# Patient Record
Sex: FEMALE | Race: BLACK OR AFRICAN AMERICAN | NOT HISPANIC OR LATINO | Employment: FULL TIME | URBAN - METROPOLITAN AREA
[De-identification: names, ages, dates, MRNs, and addresses within clinical notes are randomized per-mention and may not be internally consistent; named-entity substitution may affect disease eponyms.]

---

## 2018-05-26 ENCOUNTER — APPOINTMENT (OUTPATIENT)
Dept: RADIOLOGY | Facility: MEDICAL CENTER | Age: 53
End: 2018-05-26
Attending: EMERGENCY MEDICINE
Payer: MEDICAID

## 2018-05-26 ENCOUNTER — HOSPITAL ENCOUNTER (EMERGENCY)
Facility: MEDICAL CENTER | Age: 53
End: 2018-05-26
Attending: EMERGENCY MEDICINE
Payer: MEDICAID

## 2018-05-26 VITALS
BODY MASS INDEX: 24.32 KG/M2 | HEART RATE: 90 BPM | DIASTOLIC BLOOD PRESSURE: 70 MMHG | HEIGHT: 65 IN | WEIGHT: 145.94 LBS | SYSTOLIC BLOOD PRESSURE: 130 MMHG | TEMPERATURE: 99.7 F | RESPIRATION RATE: 20 BRPM | OXYGEN SATURATION: 96 %

## 2018-05-26 DIAGNOSIS — E87.6 HYPOKALEMIA: ICD-10-CM

## 2018-05-26 DIAGNOSIS — F10.930 ALCOHOL WITHDRAWAL SYNDROME WITHOUT COMPLICATION (HCC): ICD-10-CM

## 2018-05-26 DIAGNOSIS — J18.9 PNEUMONIA OF LEFT UPPER LOBE DUE TO INFECTIOUS ORGANISM: ICD-10-CM

## 2018-05-26 LAB
ALBUMIN SERPL BCP-MCNC: 3.3 G/DL (ref 3.2–4.9)
ALBUMIN/GLOB SERPL: 0.9 G/DL
ALP SERPL-CCNC: 82 U/L (ref 30–99)
ALT SERPL-CCNC: 21 U/L (ref 2–50)
ANION GAP SERPL CALC-SCNC: 8 MMOL/L (ref 0–11.9)
APPEARANCE UR: CLEAR
AST SERPL-CCNC: 21 U/L (ref 12–45)
BACTERIA #/AREA URNS HPF: ABNORMAL /HPF
BASOPHILS # BLD AUTO: 0.3 % (ref 0–1.8)
BASOPHILS # BLD: 0.03 K/UL (ref 0–0.12)
BILIRUB SERPL-MCNC: 0.6 MG/DL (ref 0.1–1.5)
BILIRUB UR QL STRIP.AUTO: ABNORMAL
BUN SERPL-MCNC: 8 MG/DL (ref 8–22)
CALCIUM SERPL-MCNC: 9.3 MG/DL (ref 8.4–10.2)
CHLORIDE SERPL-SCNC: 95 MMOL/L (ref 96–112)
CO2 SERPL-SCNC: 28 MMOL/L (ref 20–33)
COLOR UR: YELLOW
CREAT SERPL-MCNC: 0.66 MG/DL (ref 0.5–1.4)
EOSINOPHIL # BLD AUTO: 0.01 K/UL (ref 0–0.51)
EOSINOPHIL NFR BLD: 0.1 % (ref 0–6.9)
EPI CELLS #/AREA URNS HPF: ABNORMAL /HPF
ERYTHROCYTE [DISTWIDTH] IN BLOOD BY AUTOMATED COUNT: 42.3 FL (ref 35.9–50)
FLUAV RNA SPEC QL NAA+PROBE: NEGATIVE
FLUAV+FLUBV AG SPEC QL IA: NORMAL
FLUBV RNA SPEC QL NAA+PROBE: NEGATIVE
GLOBULIN SER CALC-MCNC: 3.5 G/DL (ref 1.9–3.5)
GLUCOSE SERPL-MCNC: 115 MG/DL (ref 65–99)
GLUCOSE UR STRIP.AUTO-MCNC: NEGATIVE MG/DL
HCT VFR BLD AUTO: 35.7 % (ref 37–47)
HGB BLD-MCNC: 12.7 G/DL (ref 12–16)
IMM GRANULOCYTES # BLD AUTO: 0.04 K/UL (ref 0–0.11)
IMM GRANULOCYTES NFR BLD AUTO: 0.4 % (ref 0–0.9)
KETONES UR STRIP.AUTO-MCNC: ABNORMAL MG/DL
LEUKOCYTE ESTERASE UR QL STRIP.AUTO: NEGATIVE
LYMPHOCYTES # BLD AUTO: 1.63 K/UL (ref 1–4.8)
LYMPHOCYTES NFR BLD: 16.4 % (ref 22–41)
MCH RBC QN AUTO: 34 PG (ref 27–33)
MCHC RBC AUTO-ENTMCNC: 35.6 G/DL (ref 33.6–35)
MCV RBC AUTO: 95.5 FL (ref 81.4–97.8)
MICRO URNS: ABNORMAL
MONOCYTES # BLD AUTO: 0.8 K/UL (ref 0–0.85)
MONOCYTES NFR BLD AUTO: 8 % (ref 0–13.4)
MUCOUS THREADS #/AREA URNS HPF: ABNORMAL /HPF
NEUTROPHILS # BLD AUTO: 7.44 K/UL (ref 2–7.15)
NEUTROPHILS NFR BLD: 74.8 % (ref 44–72)
NITRITE UR QL STRIP.AUTO: NEGATIVE
NRBC # BLD AUTO: 0 K/UL
NRBC BLD-RTO: 0 /100 WBC
PH UR STRIP.AUTO: 6.5 [PH]
PLATELET # BLD AUTO: 247 K/UL (ref 164–446)
PMV BLD AUTO: 8.7 FL (ref 9–12.9)
POTASSIUM SERPL-SCNC: 2.7 MMOL/L (ref 3.6–5.5)
PROT SERPL-MCNC: 6.8 G/DL (ref 6–8.2)
PROT UR QL STRIP: 30 MG/DL
RBC # BLD AUTO: 3.74 M/UL (ref 4.2–5.4)
RBC # URNS HPF: ABNORMAL /HPF
RBC UR QL AUTO: NEGATIVE
SIGNIFICANT IND 70042: NORMAL
SITE SITE: NORMAL
SODIUM SERPL-SCNC: 131 MMOL/L (ref 135–145)
SOURCE SOURCE: NORMAL
SP GR UR STRIP.AUTO: 1.02
WBC # BLD AUTO: 10 K/UL (ref 4.8–10.8)
WBC #/AREA URNS HPF: ABNORMAL /HPF

## 2018-05-26 PROCEDURE — 96375 TX/PRO/DX INJ NEW DRUG ADDON: CPT

## 2018-05-26 PROCEDURE — 81001 URINALYSIS AUTO W/SCOPE: CPT

## 2018-05-26 PROCEDURE — 85025 COMPLETE CBC W/AUTO DIFF WBC: CPT

## 2018-05-26 PROCEDURE — 700111 HCHG RX REV CODE 636 W/ 250 OVERRIDE (IP)

## 2018-05-26 PROCEDURE — 700105 HCHG RX REV CODE 258

## 2018-05-26 PROCEDURE — 99285 EMERGENCY DEPT VISIT HI MDM: CPT

## 2018-05-26 PROCEDURE — 36415 COLL VENOUS BLD VENIPUNCTURE: CPT

## 2018-05-26 PROCEDURE — 71045 X-RAY EXAM CHEST 1 VIEW: CPT

## 2018-05-26 PROCEDURE — 87400 INFLUENZA A/B EACH AG IA: CPT

## 2018-05-26 PROCEDURE — 700102 HCHG RX REV CODE 250 W/ 637 OVERRIDE(OP): Performed by: EMERGENCY MEDICINE

## 2018-05-26 PROCEDURE — 96365 THER/PROPH/DIAG IV INF INIT: CPT

## 2018-05-26 PROCEDURE — 80053 COMPREHEN METABOLIC PANEL: CPT

## 2018-05-26 PROCEDURE — 700105 HCHG RX REV CODE 258: Performed by: EMERGENCY MEDICINE

## 2018-05-26 PROCEDURE — 96367 TX/PROPH/DG ADDL SEQ IV INF: CPT

## 2018-05-26 PROCEDURE — 700111 HCHG RX REV CODE 636 W/ 250 OVERRIDE (IP): Performed by: EMERGENCY MEDICINE

## 2018-05-26 PROCEDURE — 87502 INFLUENZA DNA AMP PROBE: CPT

## 2018-05-26 PROCEDURE — A9270 NON-COVERED ITEM OR SERVICE: HCPCS | Performed by: EMERGENCY MEDICINE

## 2018-05-26 RX ORDER — AZITHROMYCIN 250 MG/1
TABLET, FILM COATED ORAL
Qty: 6 TAB | Refills: 0 | Status: SHIPPED | OUTPATIENT
Start: 2018-05-26 | End: 2020-01-12

## 2018-05-26 RX ORDER — ASPIRIN 325 MG
650 TABLET ORAL DAILY
Status: SHIPPED | COMMUNITY
End: 2020-01-12

## 2018-05-26 RX ORDER — AMOXICILLIN AND CLAVULANATE POTASSIUM 875; 125 MG/1; MG/1
1 TABLET, FILM COATED ORAL 2 TIMES DAILY
Qty: 20 TAB | Refills: 0 | Status: SHIPPED | OUTPATIENT
Start: 2018-05-26 | End: 2018-06-05

## 2018-05-26 RX ORDER — SODIUM CHLORIDE 9 MG/ML
1000 INJECTION, SOLUTION INTRAVENOUS ONCE
Status: COMPLETED | OUTPATIENT
Start: 2018-05-26 | End: 2018-05-26

## 2018-05-26 RX ORDER — LORAZEPAM 2 MG/ML
0.5 INJECTION INTRAMUSCULAR ONCE
Status: COMPLETED | OUTPATIENT
Start: 2018-05-26 | End: 2018-05-26

## 2018-05-26 RX ORDER — POTASSIUM CHLORIDE 20 MEQ/1
40 TABLET, EXTENDED RELEASE ORAL ONCE
Status: COMPLETED | OUTPATIENT
Start: 2018-05-26 | End: 2018-05-26

## 2018-05-26 RX ORDER — AMLODIPINE BESYLATE 10 MG/1
10 TABLET ORAL DAILY
COMMUNITY

## 2018-05-26 RX ADMIN — LORAZEPAM 2 MG: 2 INJECTION INTRAMUSCULAR; INTRAVENOUS at 07:29

## 2018-05-26 RX ADMIN — POTASSIUM CHLORIDE 40 MEQ: 1500 TABLET, EXTENDED RELEASE ORAL at 08:45

## 2018-05-26 RX ADMIN — SODIUM CHLORIDE 1000 ML: 9 INJECTION, SOLUTION INTRAVENOUS at 07:28

## 2018-05-26 RX ADMIN — AZITHROMYCIN MONOHYDRATE 500 MG: 500 INJECTION, POWDER, LYOPHILIZED, FOR SOLUTION INTRAVENOUS at 08:38

## 2018-05-26 RX ADMIN — SODIUM CHLORIDE 3 G: 900 INJECTION INTRAVENOUS at 09:55

## 2018-05-26 ASSESSMENT — PAIN SCALES - GENERAL: PAINLEVEL_OUTOF10: 8

## 2018-05-26 NOTE — ED NOTES
Cough for one week, quit smoking three days ago because she couldn't breath if she inhaled. Quit drinking three days ago also, usually had a beer and several shots of vodka a day for many years. Also has flank pain.

## 2018-05-26 NOTE — ED NOTES
Chito from Lab called with critical result of Potassium at 2.7. Critical lab result read back to Chito.   Dr. Kam notified of critical lab result at 0801.  Critical lab result read back by Dr. Kam.

## 2018-05-26 NOTE — DISCHARGE INSTRUCTIONS
Alcohol Withdrawal  Alcohol withdrawal is a group of symptoms that can develop when a person who drinks heavily and regularly stops drinking or drinks less.  What are the causes?  Heavy and regular drinking can cause chemicals that send signals from the brain to the body (neurotransmitters) to deactivate. Alcohol withdrawal develops when deactivated neurotransmitters reactivate because a person stops drinking or drinks less.  What increases the risk?  The more a person drinks and the longer he or she drinks, the greater the risk of alcohol withdrawal. Severe withdrawal is more likely to develop in someone who:  · Had severe alcohol withdrawal in the past.  · Had a seizure during a previous episode of alcohol withdrawal.  · Is elderly.  · Is pregnant.  · Has been abusing drugs.  · Has other medical problems, including:  ¨ Infection.  ¨ Heart, lung, or liver disease.  ¨ Seizures.  ¨ Mental health problems.  What are the signs or symptoms?  Symptoms of this condition can be mild to moderate, or they can be severe.  Mild to moderate symptoms may include:  · Fatigue.  · Nightmares.  · Trouble sleeping.  · Depression.  · Anxiety.  · Inability to think clearly.  · Mood swings.  · Irritability.  · Loss of appetite.  · Nausea or vomiting.  · Clammy skin.  · Extreme sweating.  · Rapid heartbeat.  · Shakiness.  · Uncontrollable shaking (tremor).  Severe symptoms may include:  · Fever.  · Seizures.  · Severe confusion.  · Feeling or seeing things that are not there (hallucinations).  Symptoms usually begin within eight hours after a person stops drinking or drinks less. They can last for weeks.  How is this diagnosed?  Alcohol withdrawal is diagnosed with a medical history and physical exam. Sometimes, urine and blood tests are also done.  How is this treated?  Treatment may involve:  · Monitoring blood pressure, pulse, and breathing.  · Getting fluids through an IV tube.  · Medicine to reduce anxiety.  · Medicine to prevent or  control seizures.  · Multivitamins and B vitamins.  · Having a health care provider check on you daily.  If symptoms are moderate to severe or if there is a risk of severe withdrawal, treatment may be done at a hospital or treatment center.  Follow these instructions at home:  · Take medicines and vitamin supplements only as directed by your health care provider.  · Do not drink alcohol.  · Have someone stay with you or be available if you need help.  · Drink enough fluid to keep your urine clear or pale yellow.  · Consider joining a 12-step program or another alcohol support group.  Contact a health care provider if:  · Your symptoms get worse or do not go away.  · You cannot keep food or water in your stomach.  · You are struggling with not drinking alcohol.  · You cannot stop drinking alcohol.  Get help right away if:  · You have an irregular heartbeat.  · You have chest pain.  · You have trouble breathing.  · You have symptoms of severe withdrawal, such as:  ¨ A fever.  ¨ Seizures.  ¨ Severe confusion.  ¨ Hallucinations.  This information is not intended to replace advice given to you by your health care provider. Make sure you discuss any questions you have with your health care provider.  Document Released: 09/27/2006 Document Revised: 04/26/2017 Document Reviewed: 10/06/2015  [employer] Interactive Patient Education © 2017 [employer]      Community-Acquired Pneumonia, Adult  Pneumonia is an infection of the lungs. There are different types of pneumonia. One type can develop while a person is in a hospital. A different type, called community-acquired pneumonia, develops in people who are not, or have not recently been, in the hospital or other health care facility.  What are the causes?  Pneumonia may be caused by bacteria, viruses, or funguses. Community-acquired pneumonia is often caused by Streptococcus pneumonia bacteria. These bacteria are often passed from one person to another by breathing in droplets  from the cough or sneeze of an infected person.  What increases the risk?  The condition is more likely to develop in:  · People who have chronic diseases, such as chronic obstructive pulmonary disease (COPD), asthma, congestive heart failure, cystic fibrosis, diabetes, or kidney disease.  · People who have early-stage or late-stage HIV.  · People who have sickle cell disease.  · People who have had their spleen removed (splenectomy).  · People who have poor dental hygiene.  · People who have medical conditions that increase the risk of breathing in (aspirating) secretions their own mouth and nose.  · People who have a weakened immune system (immunocompromised).  · People who smoke.  · People who travel to areas where pneumonia-causing germs commonly exist.  · People who are around animal habitats or animals that have pneumonia-causing germs, including birds, bats, rabbits, cats, and farm animals.  What are the signs or symptoms?  Symptoms of this condition include:  · A dry cough.  · A wet (productive) cough.  · Fever.  · Sweating.  · Chest pain, especially when breathing deeply or coughing.  · Rapid breathing or difficulty breathing.  · Shortness of breath.  · Shaking chills.  · Fatigue.  · Muscle aches.  How is this diagnosed?  Your health care provider will take a medical history and perform a physical exam. You may also have other tests, including:  · Imaging studies of your chest, including X-rays.  · Tests to check your blood oxygen level and other blood gases.  · Other tests on blood, mucus (sputum), fluid around your lungs (pleural fluid), and urine.  If your pneumonia is severe, other tests may be done to identify the specific cause of your illness.  How is this treated?  The type of treatment that you receive depends on many factors, such as the cause of your pneumonia, the medicines you take, and other medical conditions that you have. For most adults, treatment and recovery from pneumonia may occur at  home. In some cases, treatment must happen in a hospital. Treatment may include:  · Antibiotic medicines, if the pneumonia was caused by bacteria.  · Antiviral medicines, if the pneumonia was caused by a virus.  · Medicines that are given by mouth or through an IV tube.  · Oxygen.  · Respiratory therapy.  Although rare, treating severe pneumonia may include:  · Mechanical ventilation. This is done if you are not breathing well on your own and you cannot maintain a safe blood oxygen level.  · Thoracentesis. This procedure removes fluid around one lung or both lungs to help you breathe better.  Follow these instructions at home:  · Take over-the-counter and prescription medicines only as told by your health care provider.  ¨ Only take cough medicine if you are losing sleep. Understand that cough medicine can prevent your body’s natural ability to remove mucus from your lungs.  ¨ If you were prescribed an antibiotic medicine, take it as told by your health care provider. Do not stop taking the antibiotic even if you start to feel better.  · Sleep in a semi-upright position at night. Try sleeping in a reclining chair, or place a few pillows under your head.  · Do not use tobacco products, including cigarettes, chewing tobacco, and e-cigarettes. If you need help quitting, ask your health care provider.  · Drink enough water to keep your urine clear or pale yellow. This will help to thin out mucus secretions in your lungs.  How is this prevented?  There are ways that you can decrease your risk of developing community-acquired pneumonia. Consider getting a pneumococcal vaccine if:  · You are older than 65 years of age.  · You are older than 19 years of age and are undergoing cancer treatment, have chronic lung disease, or have other medical conditions that affect your immune system. Ask your health care provider if this applies to you.  There are different types and schedules of pneumococcal vaccines. Ask your health care  provider which vaccination option is best for you.  You may also prevent community-acquired pneumonia if you take these actions:  · Get an influenza vaccine every year. Ask your health care provider which type of influenza vaccine is best for you.  · Go to the dentist on a regular basis.  · Wash your hands often. Use hand  if soap and water are not available.  Contact a health care provider if:  · You have a fever.  · You are losing sleep because you cannot control your cough with cough medicine.  Get help right away if:  · You have worsening shortness of breath.  · You have increased chest pain.  · Your sickness becomes worse, especially if you are an older adult or have a weakened immune system.  · You cough up blood.  This information is not intended to replace advice given to you by your health care provider. Make sure you discuss any questions you have with your health care provider.  Document Released: 12/18/2006 Document Revised: 04/27/2017 Document Reviewed: 04/13/2016  [employer] Interactive Patient Education © 2017 [employer]    Hypokalemia  Hypokalemia means a low potassium level in the blood. Symptoms may include muscle weakness and cramping, fatigue, abdominal pain, vomiting, constipation, or irregularities of the heartbeat. Sometimes hypokalemia is discovered by your caregiver if you are taking certain medicines for high blood pressure or kidney disease.   Potassium is an electrolyte that helps regulate the amount of fluid in the body. It also stimulates muscle contraction and maintains a stable acid-base balance. If potassium levels go too low or too high, your health may be in danger. You are at risk for developing shock, heart, and lung problems. Hypokalemia can occur if you have excessive diarrhea, vomiting, or sweating. Potassium can be lost through your kidneys in the urine. Certain common medicines can also cause potassium loss, especially water pills (diuretics). The same is possible  with cortisone medications or certain types of antibiotics. Low potassium can be dangerous if you are taking certain heart medicines. In diabetes, your potassium may fall after you take insulin, especially if your diabetes had been out of control for a while. In rare cases, potassium may be low because you are not getting enough in your diet.   In adults, a potassium level below 3.5 mEq/L is usually considered low.  Hypokalemia can be treated with potassium supplements taken by mouth and a diet that is high in potassium. Foods with high potassium content are:  · Peas, lentils, lima beans, nuts, and dried fruit.   · Whole grain and bran cereals and breads.   · Fresh fruit and vegetables. Examples include:   · Bananas.   · Cantaloupe.   · Grapefruit.   · Oranges.   · Tomatoes.   · Honeydew melons.   · Potatoes.   · Peaches.   · Orange and tomato juices.   · Meats.   See your caregiver as instructed for a follow-up blood test to be sure your potassium is back to normal.  SEEK MEDICAL CARE IF:   · You have nausea, vomiting, constipation, or abdominal pain.   · You have palpitations or irregular heartbeats, chest pain or shortness of breath.   · You have muscle cramps or weakness or fatigue.   · You have lethargy.   SEEK IMMEDIATE MEDICAL CARE IF:   · You have paralysis.   · You have confusion or other mental status changes.   Document Released: 12/18/2006 Document Revised: 03/11/2013 Document Reviewed: 04/13/2011  [employer]® Patient Information ©2013 [employer].

## 2018-05-26 NOTE — ED PROVIDER NOTES
ED Provider Note    CHIEF COMPLAINT  Chief Complaint   Patient presents with   • Cough       HPI  Tatyana Dallas is a 52 y.o. female who presents for evaluation of a cough. The patient states for the past 3 days she has been having a cough. It's been productive of brownish sputum. She thinks she's had fevers associated with this. She is complaining of generalized body aches to include a headache. He is had no vomiting. Coincidentally she stopped drinking alcohol 3 days ago. She was a daily drinker consuming both beer and vodka. She also stopped smoking 3 days ago. She has a history of hypertension.    REVIEW OF SYSTEMS  See HPI for further details. All other systems are negative.     PAST MEDICAL HISTORY  Past Medical History:   Diagnosis Date   • ETOH abuse 2/27/2013   • HTN (hypertension) 2/27/2013   • Hypertension    • Tobacco dependence 2/27/2013       FAMILY HISTORY  Family History   Problem Relation Age of Onset   • Hypertension Mother    • Diabetes Mother    • Diabetes Father    • Hypertension Father    • Heart Disease Father    • Cancer Maternal Grandmother 50     breast, uterine   • Stroke Neg Hx    • Hyperlipidemia Neg Hx    • Genetic Neg Hx    • Lung Disease Neg Hx        SOCIAL HISTORY  Social History     Social History   • Marital status: Single     Spouse name: N/A   • Number of children: N/A   • Years of education: N/A     Social History Main Topics   • Smoking status: Current Every Day Smoker     Packs/day: 0.25     Years: 30.00     Types: Cigarettes   • Smokeless tobacco: Never Used   • Alcohol use 14.0 oz/week     28 Cans of beer per week   • Drug use: No   • Sexual activity: Yes     Partners: Male     Other Topics Concern   • Not on file     Social History Narrative   • No narrative on file       SURGICAL HISTORY  Past Surgical History:   Procedure Laterality Date   • OTHER ORTHOPEDIC SURGERY  2001    ankle       CURRENT MEDICATIONS  Home Medications    **Home medications have not yet been  "reviewed for this encounter**         ALLERGIES  No Known Allergies    PHYSICAL EXAM  VITAL SIGNS: /85   Pulse (!) 127   Temp 37.6 °C (99.7 °F)   Resp 20   Ht 1.651 m (5' 5\")   Wt 66.2 kg (145 lb 15.1 oz)   SpO2 96%   BMI 24.29 kg/m²     Constitutional: Well developed, Well nourished, No acute distress, Non-toxic appearance.   HENT: Normocephalic, Atraumatic.   Eyes:  EOMI, Conjunctiva normal, No discharge.   Cardiovascular: Tachycardic, Normal rhythm, No murmurs, No rubs, No gallops.   Thorax & Lungs: Lungs clear to auscultation bilaterally without wheezes, rales or rhonchi. No respiratory distress.   Abdomen:  Soft, No tenderness, No masses, No pulsatile masses. No guarding and no rebound.  Skin: Warm, Dry.   Extremities:  No edema, No cyanosis. No calf tenderness or swelling.  Musculoskeletal: Good range of motion in all major joints.  Neurologic: Awake alert, No focal deficits noted.       RADIOLOGY/PROCEDURES  DX-CHEST-PORTABLE (1 VIEW)   Final Result         1. Ill-defined opacity in the left upper lobe, concerning for pneumonia.            COURSE & MEDICAL DECISION MAKING  Pertinent Labs & Imaging studies reviewed. (See chart for details)  Is a 52-year-old here for evaluation of cough. The patient is afebrile. She is not hypoxemic she is however tachycardic on arrival. 3 days ago she quit smoking and stop drinking alcohol. I suspect she is having some alcohol withdrawal symptoms at this time. Laboratories are obtained to include a urinalysis which is positive for trace ketones but negative for evidence of infection. Due to her reported fever a rapid influenza is obtained and has come back negative. Chemistries are most notable for a potassium being low at 2.7. In speaking with the patient she states that she and in fact her entire family have problems with low potassium and she has potassium pills at home that she has not been taking. CBC shows normal white count with a differential 74 polys and " 16 lymphocytes. Chest x-ray shows an ill-defined opacity in the left upper lobe concerning for pneumonia. Based on her presentation and evaluation I suspect this does represent pneumonia. Due to her history of alcohol abuse certainly an aspiration pneumonia must be considered. She is treated with Unasyn and azithromycin IV. The patient does not want to be admitted to the hospital at this time. She is treated with 40 mEq of Anna Dur by mouth. She is also been hydrated with normal saline. This is resulted in a decrease in her heart rate and her symptoms have also been improved by treatment with half a milligram of Ativan. She states that she is followed at the [facility] and I requested that she make an appointment for reevaluation there. I will provide her prescriptions for Augmentin and azithromycin. She should return to the emergency department for any worsening symptoms. She is given a discharge instruction sheet on pneumonia, hypokalemia, and alcohol withdrawal.    FINAL IMPRESSION  1. Left upper lobe pneumonia, CAP versus aspiration  2. Hypokalemia  3. Alcohol withdrawal         Electronically signed by: Juan Carlos Kam, 5/26/2018 7:05 AM

## 2018-05-26 NOTE — ED NOTES
Med Rec completed per patient  Allergies reviewed  No ORAL antibiotics in last 30 days    Patient also stated she has had a ton of of OTC stuff for her  Cough

## 2018-05-26 NOTE — ED NOTES
Patient understands discharge instructions. Will follow up with her MD. Understands she needs to take all her antibiotics. She states she is considering attending AA to help her with sobriety.

## 2018-10-15 ENCOUNTER — NON-PROVIDER VISIT (OUTPATIENT)
Dept: URGENT CARE | Facility: CLINIC | Age: 53
End: 2018-10-15

## 2018-10-15 DIAGNOSIS — Z02.1 PRE-EMPLOYMENT DRUG SCREENING: ICD-10-CM

## 2018-10-15 LAB
AMP AMPHETAMINE: NORMAL
BAR BARBITURATES: NORMAL
BZO BENZODIAZEPINES: NORMAL
COC COCAINE: NORMAL
INT CON NEG: NORMAL
INT CON POS: NORMAL
MDMA ECSTASY: NORMAL
MET METHAMPHETAMINES: NORMAL
MTD METHADONE: NORMAL
OPI OPIATES: NORMAL
OXY OXYCODONE: NORMAL
PCP PHENCYCLIDINE: NORMAL
POC URINE DRUG SCREEN OCDRS: NORMAL
THC: NORMAL

## 2018-10-15 PROCEDURE — 80305 DRUG TEST PRSMV DIR OPT OBS: CPT | Performed by: FAMILY MEDICINE

## 2019-10-18 ENCOUNTER — NON-PROVIDER VISIT (OUTPATIENT)
Dept: URGENT CARE | Facility: CLINIC | Age: 54
End: 2019-10-18

## 2019-10-18 DIAGNOSIS — Z02.1 PRE-EMPLOYMENT DRUG SCREENING: ICD-10-CM

## 2019-10-18 LAB
AMP AMPHETAMINE: POSITIVE
BAR BARBITURATES: NORMAL
BZO BENZODIAZEPINES: NORMAL
COC COCAINE: NORMAL
INT CON NEG: NEGATIVE
INT CON POS: POSITIVE
MDMA ECSTASY: NORMAL
MET METHAMPHETAMINES: NORMAL
MTD METHADONE: POSITIVE
OPI OPIATES: NORMAL
OXY OXYCODONE: NORMAL
PCP PHENCYCLIDINE: NORMAL
POC URINE DRUG SCREEN OCDRS: NORMAL
THC: NORMAL

## 2019-10-18 PROCEDURE — 80305 DRUG TEST PRSMV DIR OPT OBS: CPT | Performed by: PHYSICIAN ASSISTANT

## 2019-10-18 PROCEDURE — 8898 PR URINE 11 PANEL - SEND TO LAB: Performed by: PHYSICIAN ASSISTANT

## 2020-01-12 ENCOUNTER — APPOINTMENT (OUTPATIENT)
Dept: RADIOLOGY | Facility: MEDICAL CENTER | Age: 55
End: 2020-01-12
Attending: EMERGENCY MEDICINE
Payer: MEDICAID

## 2020-01-12 ENCOUNTER — HOSPITAL ENCOUNTER (EMERGENCY)
Facility: MEDICAL CENTER | Age: 55
End: 2020-01-12
Attending: EMERGENCY MEDICINE
Payer: MEDICAID

## 2020-01-12 VITALS
TEMPERATURE: 97.6 F | RESPIRATION RATE: 20 BRPM | SYSTOLIC BLOOD PRESSURE: 129 MMHG | WEIGHT: 143.3 LBS | HEIGHT: 65 IN | OXYGEN SATURATION: 96 % | DIASTOLIC BLOOD PRESSURE: 77 MMHG | BODY MASS INDEX: 23.88 KG/M2 | HEART RATE: 95 BPM

## 2020-01-12 DIAGNOSIS — S32.391A: ICD-10-CM

## 2020-01-12 PROCEDURE — 72192 CT PELVIS W/O DYE: CPT

## 2020-01-12 PROCEDURE — 700111 HCHG RX REV CODE 636 W/ 250 OVERRIDE (IP): Performed by: EMERGENCY MEDICINE

## 2020-01-12 PROCEDURE — 96372 THER/PROPH/DIAG INJ SC/IM: CPT

## 2020-01-12 PROCEDURE — 99285 EMERGENCY DEPT VISIT HI MDM: CPT

## 2020-01-12 PROCEDURE — 73501 X-RAY EXAM HIP UNI 1 VIEW: CPT | Mod: RT

## 2020-01-12 RX ORDER — MORPHINE SULFATE 4 MG/ML
4 INJECTION, SOLUTION INTRAMUSCULAR; INTRAVENOUS ONCE
Status: COMPLETED | OUTPATIENT
Start: 2020-01-12 | End: 2020-01-12

## 2020-01-12 RX ORDER — IBUPROFEN 800 MG/1
800 TABLET ORAL EVERY 8 HOURS PRN
Qty: 15 TAB | Refills: 0 | Status: SHIPPED | OUTPATIENT
Start: 2020-01-12 | End: 2020-02-24

## 2020-01-12 RX ORDER — ONDANSETRON 4 MG/1
4 TABLET, ORALLY DISINTEGRATING ORAL ONCE
Status: COMPLETED | OUTPATIENT
Start: 2020-01-12 | End: 2020-01-12

## 2020-01-12 RX ORDER — ACETAMINOPHEN 500 MG
2000 TABLET ORAL EVERY 6 HOURS PRN
Status: SHIPPED | COMMUNITY
End: 2020-02-24

## 2020-01-12 RX ORDER — TRAMADOL HYDROCHLORIDE 50 MG/1
100 TABLET ORAL EVERY 4 HOURS PRN
Qty: 20 TAB | Refills: 0 | Status: SHIPPED | OUTPATIENT
Start: 2020-01-12 | End: 2020-01-15

## 2020-01-12 RX ADMIN — ONDANSETRON 4 MG: 4 TABLET, ORALLY DISINTEGRATING ORAL at 12:22

## 2020-01-12 RX ADMIN — MORPHINE SULFATE 4 MG: 4 INJECTION INTRAVENOUS at 12:22

## 2020-01-12 ASSESSMENT — LIFESTYLE VARIABLES
HAVE YOU EVER FELT YOU SHOULD CUT DOWN ON YOUR DRINKING: NO
DO YOU DRINK ALCOHOL: YES
HOW MANY TIMES IN THE PAST YEAR HAVE YOU HAD 5 OR MORE DRINKS IN A DAY: 0
EVER FELT BAD OR GUILTY ABOUT YOUR DRINKING: NO
TOTAL SCORE: 0
CONSUMPTION TOTAL: POSITIVE
TOTAL SCORE: 0
ON A TYPICAL DAY WHEN YOU DRINK ALCOHOL HOW MANY DRINKS DO YOU HAVE: 2
AVERAGE NUMBER OF DAYS PER WEEK YOU HAVE A DRINK CONTAINING ALCOHOL: 4
DOES PATIENT WANT TO STOP DRINKING: NO
HAVE PEOPLE ANNOYED YOU BY CRITICIZING YOUR DRINKING: NO
TOTAL SCORE: 0
EVER HAD A DRINK FIRST THING IN THE MORNING TO STEADY YOUR NERVES TO GET RID OF A HANGOVER: NO

## 2020-01-12 NOTE — ED NOTES
"Assumed care of patient. Pt AOx4, breathing regular and unlabored. Pt c/o R hip and pelvic pain s/p GL fall yesterday. Pt states \"I was catching someone else who was falling and then he fell on top of me\". Pt denies hitting head, LOC. Pt states \"I've had to use my friends cane to get around\". Denies numbness/tingling. Pt resting comfortably, no questions, will continue to monitor.   "

## 2020-01-12 NOTE — ED NOTES
Patient educated on discharge instructions and use of crutches, verbalized understanding. No IV in place. Patient ambulated steadily and independently from ED, belongings w patient.

## 2020-01-12 NOTE — ED TRIAGE NOTES
"Presents complaining of right hip pain after experiencing a GL fall yesterday.  Chief Complaint   Patient presents with   • T-5000 FALL   • Pelvic Pain     /79   Pulse (!) 110   Temp 36.4 °C (97.6 °F) (Temporal)   Resp 20   Ht 1.651 m (5' 5\")   Wt 65 kg (143 lb 4.8 oz)   LMP 07/29/2013   SpO2 96%   BMI 23.85 kg/m²     "

## 2020-01-12 NOTE — ED PROVIDER NOTES
ED Provider Note    CHIEF COMPLAINT  Chief Complaint   Patient presents with   • T-5000 FALL   • Pelvic Pain       HPI  Tatyana Dallas is a 54 y.o. female here for evaluation of right hip pain and pelvic pain.  The patient states that she was trying to help somebody walk yesterday, when that person fell, landing on her right hip.  She fell to the ground, she did not have any loss of consciousness.  She has no neck pain, no back pain, no chest pain, and no shortness of breath.  Patient has no vomiting or fever chills.  She states that she has been walking on the right hip, but does have pain so she is using a cane, which alleviates the symptom.  She not take anything prior to arrival for the discomfort.  She denies any other radiation of the pain other than from the pelvis and right hip.  She has no right knee pain.  She describes the pain is an aching and intermittently sharp pain.    ROS;  Please see HPI  O/W negative     PAST MEDICAL HISTORY   has a past medical history of ETOH abuse (2/27/2013), HTN (hypertension) (2/27/2013), Hypertension, and Tobacco dependence (2/27/2013).    SOCIAL HISTORY  Social History     Tobacco Use   • Smoking status: Current Every Day Smoker     Packs/day: 0.10     Years: 30.00     Pack years: 3.00     Types: Cigarettes   • Smokeless tobacco: Never Used   Substance and Sexual Activity   • Alcohol use: Yes     Alcohol/week: 14.0 oz     Types: 28 Cans of beer per week     Comment: Occasionally   • Drug use: No   • Sexual activity: Yes     Partners: Male       SURGICAL HISTORY   has a past surgical history that includes other orthopedic surgery (2001).    CURRENT MEDICATIONS  Home Medications    **Home medications have not yet been reviewed for this encounter**         ALLERGIES  No Known Allergies    REVIEW OF SYSTEMS  See HPI for further details. Review of systems as above, otherwise all other systems are negative.     PHYSICAL EXAM  VITAL SIGNS: /79   Pulse (!) 110   Temp 36.4  "°C (97.6 °F) (Temporal)   Resp 20   Ht 1.651 m (5' 5\")   Wt 65 kg (143 lb 4.8 oz)   LMP 07/29/2013   SpO2 96%   BMI 23.85 kg/m²     Constitutional: Well developed, well nourished. No acute distress.  HEENT: Normocephalic, atraumatic. MMM  Neck: Supple, Full range of motion   Chest/Pulmonary:  No respiratory distress.  Equal expansion   Musculoskeletal: No deformity, no edema, neurovascular intact.  Right lower extremity tenderness over the right greater trochanter, but nontender femur and nontender right knee.  Neurovascular intact distally to bilateral extremities.  DPP present bilaterally.  Internal/external rotation of bilateral lower extremities intact.  Good range of motion.  Neuro: Clear speech, appropriate, cooperative, cranial nerves II-XII grossly intact.  Psych: Normal mood and affect    CT-PELVIS W/O   Final Result      1.  There is a possible nondisplaced right inferior obturator ring fracture.   2.  Exam is otherwise unremarkable.      DX-HIP-UNILATERAL-WITH PELVIS-1 VIEW RIGHT   Final Result      No radiographic evidence of acute traumatic injury. If symptoms persist, MRI could be performed to evaluate for soft tissue or occult bony injury          PROCEDURES     MEDICAL RECORD  I have reviewed patient's medical record and pertinent results are listed.    COURSE & MEDICAL DECISION MAKING  I have reviewed any medical record information, laboratory studies and radiographic results as noted above.      11:08 AM  At this time, the patient has no acute fracture noted.  She is ambulatory.  The patient will be given pain medications, and will be reevaluated in the next 1 to 2 days.  If her symptoms persist, then she will likely have MR or CT.  At this time her if she has good range of motion with right lower extremity, no tenderness with internal or external rotation, and is able to bear weight and walk.  This is favoring more of a contusion at this point.    12:15 PM  I spoke to Dr. Eastman, who will " see as follow up. He states ok with crutches.       I you have had any blood pressure issues while here in the emergency department, please see your doctor for a further evaluation or work up.    Differential diagnoses include but not limited to: Fusion versus fracture    This patient presents with right hip contusion.  At this time, I have counseled the patient/family regarding their medications, pain control, and follow up.  They will continue their medications, if any, as prescribed.  They will return immediately for any worsening symptoms and/or any other medical concerns.  They will see their doctor, or contact the doctor provided, in 1-2 days for follow up.       FINAL IMPRESSION  Pelvic fracture       Electronically signed by: Demetrius Rust, 1/12/2020 10:45 AM

## 2020-02-24 ENCOUNTER — HOSPITAL ENCOUNTER (EMERGENCY)
Facility: MEDICAL CENTER | Age: 55
End: 2020-02-24
Attending: EMERGENCY MEDICINE
Payer: MEDICAID

## 2020-02-24 ENCOUNTER — APPOINTMENT (OUTPATIENT)
Dept: RADIOLOGY | Facility: MEDICAL CENTER | Age: 55
End: 2020-02-24
Attending: EMERGENCY MEDICINE
Payer: MEDICAID

## 2020-02-24 VITALS
WEIGHT: 139.77 LBS | HEART RATE: 94 BPM | RESPIRATION RATE: 19 BRPM | TEMPERATURE: 97.3 F | DIASTOLIC BLOOD PRESSURE: 77 MMHG | BODY MASS INDEX: 23.86 KG/M2 | HEIGHT: 64 IN | OXYGEN SATURATION: 97 % | SYSTOLIC BLOOD PRESSURE: 131 MMHG

## 2020-02-24 DIAGNOSIS — S32.591D CLOSED FRACTURE OF MULTIPLE PUBIC RAMI, RIGHT, WITH ROUTINE HEALING, SUBSEQUENT ENCOUNTER: ICD-10-CM

## 2020-02-24 DIAGNOSIS — E87.6 HYPOKALEMIA: ICD-10-CM

## 2020-02-24 DIAGNOSIS — R10.32 LEFT LOWER QUADRANT ABDOMINAL PAIN: ICD-10-CM

## 2020-02-24 DIAGNOSIS — R19.7 DIARRHEA, UNSPECIFIED TYPE: ICD-10-CM

## 2020-02-24 LAB
ALBUMIN SERPL BCP-MCNC: 4.5 G/DL (ref 3.2–4.9)
ALBUMIN/GLOB SERPL: 1.7 G/DL
ALP SERPL-CCNC: 112 U/L (ref 30–99)
ALT SERPL-CCNC: 12 U/L (ref 2–50)
ANION GAP SERPL CALC-SCNC: 13 MMOL/L (ref 7–16)
APPEARANCE UR: CLEAR
AST SERPL-CCNC: 14 U/L (ref 12–45)
BASOPHILS # BLD AUTO: 0.3 % (ref 0–1.8)
BASOPHILS # BLD: 0.03 K/UL (ref 0–0.12)
BILIRUB SERPL-MCNC: 0.3 MG/DL (ref 0.1–1.5)
BILIRUB UR QL STRIP.AUTO: ABNORMAL
BUN SERPL-MCNC: 21 MG/DL (ref 8–22)
CALCIUM SERPL-MCNC: 9.7 MG/DL (ref 8.4–10.2)
CHLORIDE SERPL-SCNC: 98 MMOL/L (ref 96–112)
CO2 SERPL-SCNC: 22 MMOL/L (ref 20–33)
COLOR UR: YELLOW
CREAT SERPL-MCNC: 0.78 MG/DL (ref 0.5–1.4)
EOSINOPHIL # BLD AUTO: 0.05 K/UL (ref 0–0.51)
EOSINOPHIL NFR BLD: 0.5 % (ref 0–6.9)
ERYTHROCYTE [DISTWIDTH] IN BLOOD BY AUTOMATED COUNT: 40.8 FL (ref 35.9–50)
GLOBULIN SER CALC-MCNC: 2.6 G/DL (ref 1.9–3.5)
GLUCOSE SERPL-MCNC: 97 MG/DL (ref 65–99)
GLUCOSE UR STRIP.AUTO-MCNC: NEGATIVE MG/DL
HCT VFR BLD AUTO: 38.5 % (ref 37–47)
HGB BLD-MCNC: 13.4 G/DL (ref 12–16)
IMM GRANULOCYTES # BLD AUTO: 0.05 K/UL (ref 0–0.11)
IMM GRANULOCYTES NFR BLD AUTO: 0.5 % (ref 0–0.9)
KETONES UR STRIP.AUTO-MCNC: 15 MG/DL
LEUKOCYTE ESTERASE UR QL STRIP.AUTO: NEGATIVE
LIPASE SERPL-CCNC: 43 U/L (ref 7–58)
LYMPHOCYTES # BLD AUTO: 2.63 K/UL (ref 1–4.8)
LYMPHOCYTES NFR BLD: 25.1 % (ref 22–41)
MCH RBC QN AUTO: 33.2 PG (ref 27–33)
MCHC RBC AUTO-ENTMCNC: 34.8 G/DL (ref 33.6–35)
MCV RBC AUTO: 95.3 FL (ref 81.4–97.8)
MICRO URNS: ABNORMAL
MONOCYTES # BLD AUTO: 0.74 K/UL (ref 0–0.85)
MONOCYTES NFR BLD AUTO: 7.1 % (ref 0–13.4)
NEUTROPHILS # BLD AUTO: 6.96 K/UL (ref 2–7.15)
NEUTROPHILS NFR BLD: 66.5 % (ref 44–72)
NITRITE UR QL STRIP.AUTO: NEGATIVE
NRBC # BLD AUTO: 0 K/UL
NRBC BLD-RTO: 0 /100 WBC
PH UR STRIP.AUTO: 6 [PH] (ref 5–8)
PLATELET # BLD AUTO: 265 K/UL (ref 164–446)
PMV BLD AUTO: 9 FL (ref 9–12.9)
POTASSIUM SERPL-SCNC: 3 MMOL/L (ref 3.6–5.5)
PROT SERPL-MCNC: 7.1 G/DL (ref 6–8.2)
PROT UR QL STRIP: NEGATIVE MG/DL
RBC # BLD AUTO: 4.04 M/UL (ref 4.2–5.4)
RBC UR QL AUTO: NEGATIVE
SODIUM SERPL-SCNC: 133 MMOL/L (ref 135–145)
SP GR UR STRIP.AUTO: 1.02
WBC # BLD AUTO: 10.5 K/UL (ref 4.8–10.8)

## 2020-02-24 PROCEDURE — 96374 THER/PROPH/DIAG INJ IV PUSH: CPT

## 2020-02-24 PROCEDURE — 700102 HCHG RX REV CODE 250 W/ 637 OVERRIDE(OP): Performed by: EMERGENCY MEDICINE

## 2020-02-24 PROCEDURE — A9270 NON-COVERED ITEM OR SERVICE: HCPCS | Performed by: EMERGENCY MEDICINE

## 2020-02-24 PROCEDURE — 74177 CT ABD & PELVIS W/CONTRAST: CPT

## 2020-02-24 PROCEDURE — 99285 EMERGENCY DEPT VISIT HI MDM: CPT

## 2020-02-24 PROCEDURE — 700111 HCHG RX REV CODE 636 W/ 250 OVERRIDE (IP): Performed by: EMERGENCY MEDICINE

## 2020-02-24 PROCEDURE — 81003 URINALYSIS AUTO W/O SCOPE: CPT

## 2020-02-24 PROCEDURE — 96375 TX/PRO/DX INJ NEW DRUG ADDON: CPT

## 2020-02-24 PROCEDURE — 83690 ASSAY OF LIPASE: CPT

## 2020-02-24 PROCEDURE — 80053 COMPREHEN METABOLIC PANEL: CPT

## 2020-02-24 PROCEDURE — 85025 COMPLETE CBC W/AUTO DIFF WBC: CPT

## 2020-02-24 PROCEDURE — 700117 HCHG RX CONTRAST REV CODE 255: Performed by: EMERGENCY MEDICINE

## 2020-02-24 RX ORDER — MORPHINE SULFATE 4 MG/ML
4 INJECTION, SOLUTION INTRAMUSCULAR; INTRAVENOUS ONCE
Status: COMPLETED | OUTPATIENT
Start: 2020-02-24 | End: 2020-02-24

## 2020-02-24 RX ORDER — POTASSIUM CHLORIDE 20 MEQ/1
40 TABLET, EXTENDED RELEASE ORAL ONCE
Status: COMPLETED | OUTPATIENT
Start: 2020-02-24 | End: 2020-02-24

## 2020-02-24 RX ORDER — ONDANSETRON 2 MG/ML
4 INJECTION INTRAMUSCULAR; INTRAVENOUS ONCE
Status: COMPLETED | OUTPATIENT
Start: 2020-02-24 | End: 2020-02-24

## 2020-02-24 RX ADMIN — IOHEXOL 100 ML: 350 INJECTION, SOLUTION INTRAVENOUS at 12:02

## 2020-02-24 RX ADMIN — POTASSIUM CHLORIDE 40 MEQ: 1500 TABLET, EXTENDED RELEASE ORAL at 13:36

## 2020-02-24 RX ADMIN — ONDANSETRON 4 MG: 2 INJECTION INTRAMUSCULAR; INTRAVENOUS at 12:00

## 2020-02-24 RX ADMIN — MORPHINE SULFATE 4 MG: 4 INJECTION INTRAVENOUS at 12:00

## 2020-02-24 ASSESSMENT — PAIN DESCRIPTION - DESCRIPTORS: DESCRIPTORS: ACHING

## 2020-02-24 NOTE — DISCHARGE INSTRUCTIONS
See your doctor for recheck in 1 to 2 weeks.  Use crutches if it is painful to walk.  Return for any concerns

## 2020-02-24 NOTE — ED PROVIDER NOTES
ED Provider Note    CHIEF COMPLAINT  Chief Complaint   Patient presents with   • Abdominal Pain     pt had a minor fx to her R hip in January. pt now has abd pain radiating to R flank with diarrhea for 2 days. denies blood in stool.        HPI  Tatyana Dallas is a 54 y.o. female who presents with diarrhea, left lower abdominal cramping onset 2 days ago.  She states she has has had some increasing discomfort in her pelvis while standing at work.  No new injury.  She fell in January, was diagnosed with pubic rami fracture at the time via CT scan.  Patient states she followed directions, did follow-up with Dr. Santana at [facility] [place] [facility].  Patient states she was was told the fracture would heal without intervention.  No chest pain or shortness of breath.  No fever, no dizziness.  Patient requested dose of morphine for pain control.    REVIEW OF SYSTEMS  Constitutional: No fever  Respiratory: No shortness of breath  Cardiac: No chest pain or syncope  Gastrointestinal: Diarrhea, upper quadrant abdominal cramping.  Nausea but no vomiting  Musculoskeletal: Pelvic pain from previous fracture  Neurologic: No headache  Genitourinary: Denies dysuria       All other systems are negative.     PAST MEDICAL HISTORY  Past Medical History:   Diagnosis Date   • ETOH abuse 2/27/2013   • HTN (hypertension) 2/27/2013   • Hypertension    • Tobacco dependence 2/27/2013       FAMILY HISTORY  Family History   Problem Relation Age of Onset   • Hypertension Mother    • Diabetes Mother    • Diabetes Father    • Hypertension Father    • Heart Disease Father    • Cancer Maternal Grandmother 50        breast, uterine   • Stroke Neg Hx    • Hyperlipidemia Neg Hx    • Genetic Disorder Neg Hx    • Lung Disease Neg Hx        SOCIAL HISTORY  Social History     Socioeconomic History   • Marital status: Single     Spouse name: Not on file   • Number of children: Not on file   • Years of education: Not on file   • Highest education level: Not on  "file   Occupational History   • Not on file   Social Needs   • Financial resource strain: Not on file   • Food insecurity     Worry: Not on file     Inability: Not on file   • Transportation needs     Medical: Not on file     Non-medical: Not on file   Tobacco Use   • Smoking status: Current Every Day Smoker     Packs/day: 0.10     Years: 30.00     Pack years: 3.00     Types: Cigarettes   • Smokeless tobacco: Never Used   Substance and Sexual Activity   • Alcohol use: Yes     Alcohol/week: 14.0 oz     Types: 28 Cans of beer per week     Comment: Occasionally   • Drug use: No   • Sexual activity: Yes     Partners: Male   Lifestyle   • Physical activity     Days per week: Not on file     Minutes per session: Not on file   • Stress: Not on file   Relationships   • Social connections     Talks on phone: Not on file     Gets together: Not on file     Attends Hindu service: Not on file     Active member of club or organization: Not on file     Attends meetings of clubs or organizations: Not on file     Relationship status: Not on file   • Intimate partner violence     Fear of current or ex partner: Not on file     Emotionally abused: Not on file     Physically abused: Not on file     Forced sexual activity: Not on file   Other Topics Concern   • Not on file   Social History Narrative   • Not on file       SURGICAL HISTORY  Past Surgical History:   Procedure Laterality Date   • OTHER ORTHOPEDIC SURGERY  2001    ankle       CURRENT MEDICATIONS  Home Medications    **Home medications have not yet been reviewed for this encounter**         ALLERGIES  No Known Allergies    PHYSICAL EXAM  VITAL SIGNS: /77   Pulse 94   Temp 36.3 °C (97.3 °F) (Temporal)   Resp 19   Ht 1.626 m (5' 4\")   Wt 63.4 kg (139 lb 12.4 oz)   LMP 07/29/2013   SpO2 97%   BMI 23.99 kg/m²   Constitutional: Well-nourished, no distress  ENT: Nares clear, mucous membranes moist.  Eyes:  Conjunctiva normal, No discharge.    Lymphatic: No " adenopathy.   Cardiovascular: Normal heart rate, Normal rhythm.   Pulmonary: No wheezing, no rales  Gastrointestinal: Abdomen is soft, mild tenderness left lower quadrant without guarding.  No palpable mass.  Skin: Warm, Dry, No jaundice.   Musculoskeletal:  No CVA tenderness.  Right pelvis mild tenderness.  No crepitance or deformity.  Neurologic:  Normal motor and sensory function, No focal deficits noted.   Psychiatric:Normal affect, Normal mood.    RADIOLOGY/PROCEDURES/Labs  Results for orders placed or performed during the hospital encounter of 02/24/20   CBC WITH DIFFERENTIAL   Result Value Ref Range    WBC 10.5 4.8 - 10.8 K/uL    RBC 4.04 (L) 4.20 - 5.40 M/uL    Hemoglobin 13.4 12.0 - 16.0 g/dL    Hematocrit 38.5 37.0 - 47.0 %    MCV 95.3 81.4 - 97.8 fL    MCH 33.2 (H) 27.0 - 33.0 pg    MCHC 34.8 33.6 - 35.0 g/dL    RDW 40.8 35.9 - 50.0 fL    Platelet Count 265 164 - 446 K/uL    MPV 9.0 9.0 - 12.9 fL    Neutrophils-Polys 66.50 44.00 - 72.00 %    Lymphocytes 25.10 22.00 - 41.00 %    Monocytes 7.10 0.00 - 13.40 %    Eosinophils 0.50 0.00 - 6.90 %    Basophils 0.30 0.00 - 1.80 %    Immature Granulocytes 0.50 0.00 - 0.90 %    Nucleated RBC 0.00 /100 WBC    Neutrophils (Absolute) 6.96 2.00 - 7.15 K/uL    Lymphs (Absolute) 2.63 1.00 - 4.80 K/uL    Monos (Absolute) 0.74 0.00 - 0.85 K/uL    Eos (Absolute) 0.05 0.00 - 0.51 K/uL    Baso (Absolute) 0.03 0.00 - 0.12 K/uL    Immature Granulocytes (abs) 0.05 0.00 - 0.11 K/uL    NRBC (Absolute) 0.00 K/uL   COMP METABOLIC PANEL   Result Value Ref Range    Sodium 133 (L) 135 - 145 mmol/L    Potassium 3.0 (L) 3.6 - 5.5 mmol/L    Chloride 98 96 - 112 mmol/L    Co2 22 20 - 33 mmol/L    Anion Gap 13.0 7.0 - 16.0    Glucose 97 65 - 99 mg/dL    Bun 21 8 - 22 mg/dL    Creatinine 0.78 0.50 - 1.40 mg/dL    Calcium 9.7 8.4 - 10.2 mg/dL    AST(SGOT) 14 12 - 45 U/L    ALT(SGPT) 12 2 - 50 U/L    Alkaline Phosphatase 112 (H) 30 - 99 U/L    Total Bilirubin 0.3 0.1 - 1.5 mg/dL    Albumin 4.5  3.2 - 4.9 g/dL    Total Protein 7.1 6.0 - 8.2 g/dL    Globulin 2.6 1.9 - 3.5 g/dL    A-G Ratio 1.7 g/dL   LIPASE   Result Value Ref Range    Lipase 43 7 - 58 U/L   URINALYSIS CULTURE, IF INDICATED   Result Value Ref Range    Color Yellow     Character Clear     Specific Gravity 1.025 <1.035    Ph 6.0 5.0 - 8.0    Glucose Negative Negative mg/dL    Ketones 15 (A) Negative mg/dL    Protein Negative Negative mg/dL    Bilirubin Small (A) Negative    Nitrite Negative Negative    Leukocyte Esterase Negative Negative    Occult Blood Negative Negative    Micro Urine Req see below    ESTIMATED GFR   Result Value Ref Range    GFR If African American >60 >60 mL/min/1.73 m 2    GFR If Non African American >60 >60 mL/min/1.73 m 2     CT-ABDOMEN-PELVIS WITH   Final Result      Healing fractures of the right sacral alar as well as the superior and inferior pubic rami on the right. Increased displacement is seen of the inferior pubic ramus fracture.      Small hypodense right renal lesion is too small to characterize but likely represents a small cyst.      Atherosclerotic plaque.               COURSE & MEDICAL DECISION MAKING  Pertinent Labs & Imaging studies reviewed. (See chart for details)  I have spoken with Dr. Cole regarding healing pubic rami fracture and sacral alar fracture, he states patient can use crutches if painful while walking otherwise healing appears to be on track.  I discussed this advice with the patient and she states she is comfortable with this management.  The diarrhea and left lower quadrant abdominal pain are of unknown etiology, differential including viral illness, food poisoning, diverticulitis.  CT scan obtained was negative for diverticulitis.  Patient received oral replacement for hypokalemia as well as instructions on dietary supplementation.  She is advised to follow-up with her doctor in 1 to 2 weeks for recheck and repeat evaluation of potassium levels.  She has agreed to return if  worse.    FINAL IMPRESSION  1. Left lower quadrant abdominal pain     2. Diarrhea, unspecified type     3. Hypokalemia     4. Closed fracture of multiple pubic rami, right, with routine healing, subsequent encounter             Electronically signed by: Barry Jones M.D., 2/24/2020 5:48 PM

## 2020-02-24 NOTE — ED TRIAGE NOTES
"Chief Complaint   Patient presents with   • Abdominal Pain     pt had a minor fx to her R hip in January. pt now has abd pain radiating to R flank with diarrhea for 2 days. denies blood in stool.      /86   Pulse (!) 101   Temp 36.3 °C (97.3 °F) (Temporal)   Resp 19   Ht 1.626 m (5' 4\")   Wt 63.4 kg (139 lb 12.4 oz)   LMP 07/29/2013   SpO2 95%   BMI 23.99 kg/m²     "

## 2021-07-28 ENCOUNTER — HOSPITAL ENCOUNTER (EMERGENCY)
Facility: MEDICAL CENTER | Age: 56
End: 2021-07-28
Attending: EMERGENCY MEDICINE
Payer: MEDICAID

## 2021-07-28 VITALS
HEART RATE: 93 BPM | SYSTOLIC BLOOD PRESSURE: 151 MMHG | DIASTOLIC BLOOD PRESSURE: 84 MMHG | HEIGHT: 65 IN | WEIGHT: 140.65 LBS | BODY MASS INDEX: 23.43 KG/M2 | OXYGEN SATURATION: 95 % | TEMPERATURE: 98.6 F | RESPIRATION RATE: 17 BRPM

## 2021-07-28 DIAGNOSIS — R00.0 TACHYCARDIA: ICD-10-CM

## 2021-07-28 LAB
ALBUMIN SERPL BCP-MCNC: 4.5 G/DL (ref 3.2–4.9)
ALBUMIN/GLOB SERPL: 1.7 G/DL
ALP SERPL-CCNC: 91 U/L (ref 30–99)
ALT SERPL-CCNC: 29 U/L (ref 2–50)
ANION GAP SERPL CALC-SCNC: 16 MMOL/L (ref 7–16)
AST SERPL-CCNC: 35 U/L (ref 12–45)
BASOPHILS # BLD AUTO: 0.4 % (ref 0–1.8)
BASOPHILS # BLD: 0.03 K/UL (ref 0–0.12)
BILIRUB SERPL-MCNC: 0.6 MG/DL (ref 0.1–1.5)
BUN SERPL-MCNC: 10 MG/DL (ref 8–22)
CALCIUM SERPL-MCNC: 9.5 MG/DL (ref 8.4–10.2)
CHLORIDE SERPL-SCNC: 94 MMOL/L (ref 96–112)
CO2 SERPL-SCNC: 23 MMOL/L (ref 20–33)
CREAT SERPL-MCNC: 0.47 MG/DL (ref 0.5–1.4)
EKG IMPRESSION: NORMAL
EOSINOPHIL # BLD AUTO: 0.03 K/UL (ref 0–0.51)
EOSINOPHIL NFR BLD: 0.4 % (ref 0–6.9)
ERYTHROCYTE [DISTWIDTH] IN BLOOD BY AUTOMATED COUNT: 44 FL (ref 35.9–50)
GLOBULIN SER CALC-MCNC: 2.7 G/DL (ref 1.9–3.5)
GLUCOSE SERPL-MCNC: 106 MG/DL (ref 65–99)
HCT VFR BLD AUTO: 38.6 % (ref 37–47)
HGB BLD-MCNC: 13.9 G/DL (ref 12–16)
IMM GRANULOCYTES # BLD AUTO: 0.04 K/UL (ref 0–0.11)
IMM GRANULOCYTES NFR BLD AUTO: 0.5 % (ref 0–0.9)
LYMPHOCYTES # BLD AUTO: 1.94 K/UL (ref 1–4.8)
LYMPHOCYTES NFR BLD: 24 % (ref 22–41)
MCH RBC QN AUTO: 35.5 PG (ref 27–33)
MCHC RBC AUTO-ENTMCNC: 36 G/DL (ref 33.6–35)
MCV RBC AUTO: 98.5 FL (ref 81.4–97.8)
MONOCYTES # BLD AUTO: 0.51 K/UL (ref 0–0.85)
MONOCYTES NFR BLD AUTO: 6.3 % (ref 0–13.4)
NEUTROPHILS # BLD AUTO: 5.52 K/UL (ref 2–7.15)
NEUTROPHILS NFR BLD: 68.4 % (ref 44–72)
NRBC # BLD AUTO: 0 K/UL
NRBC BLD-RTO: 0 /100 WBC
PLATELET # BLD AUTO: 230 K/UL (ref 164–446)
PMV BLD AUTO: 9.5 FL (ref 9–12.9)
POTASSIUM SERPL-SCNC: 2.9 MMOL/L (ref 3.6–5.5)
PROT SERPL-MCNC: 7.2 G/DL (ref 6–8.2)
RBC # BLD AUTO: 3.92 M/UL (ref 4.2–5.4)
SODIUM SERPL-SCNC: 133 MMOL/L (ref 135–145)
TROPONIN T SERPL-MCNC: 7 NG/L (ref 6–19)
WBC # BLD AUTO: 8.1 K/UL (ref 4.8–10.8)

## 2021-07-28 PROCEDURE — 84484 ASSAY OF TROPONIN QUANT: CPT

## 2021-07-28 PROCEDURE — 80053 COMPREHEN METABOLIC PANEL: CPT

## 2021-07-28 PROCEDURE — 36415 COLL VENOUS BLD VENIPUNCTURE: CPT

## 2021-07-28 PROCEDURE — 85025 COMPLETE CBC W/AUTO DIFF WBC: CPT

## 2021-07-28 PROCEDURE — 99283 EMERGENCY DEPT VISIT LOW MDM: CPT

## 2021-07-28 PROCEDURE — 93005 ELECTROCARDIOGRAM TRACING: CPT

## 2021-07-28 PROCEDURE — 93005 ELECTROCARDIOGRAM TRACING: CPT | Performed by: EMERGENCY MEDICINE

## 2021-07-28 ASSESSMENT — FIBROSIS 4 INDEX: FIB4 SCORE: .8082903768654760703

## 2021-07-28 NOTE — ED TRIAGE NOTES
"Pt comes in w/ mother  Was sent here from her PCP for \"irregular HR\"  Denies any other complaints EKG done in triage   "

## 2021-07-28 NOTE — ED NOTES
Med rec updated and complete  Allergies reviewed  Interviewed pt with mother at bedside with permission from pt.  Pt reports no vitamins.  Pt reports no antibiotics in the last 30 days.    No current facility-administered medications on file prior to encounter.     Current Outpatient Medications on File Prior to Encounter   Medication Sig Dispense Refill   • Pseudoephedrine HCl (DECONGESTANT PO) Take 2 Tablets by mouth as needed (For congestion).     • amLODIPine (NORVASC) 10 MG Tab Take 10 mg by mouth every day.

## 2021-07-28 NOTE — ED PROVIDER NOTES
ED Provider Note    ED Provider Note    Primary care provider: Rashmi Sigala M.D.  Means of arrival: Walk-in  History obtained from: Patient    CHIEF COMPLAINT  Chief Complaint   Patient presents with   • Sent by MD     sent here by MD for abnormal EKG      Seen at 2:08 PM.   HPI  Tatyana Dallas is a 55 y.o. female who presents to the Emergency Department for a tachycardia.  The patient went to her primary care physician for her yearly visit.  In the clinic she had a resting heart rate of around 120.  They did an EKG that showed some lateral ST depressions and she was sent here.  The patient does admit that she drinks alcohol fairly heavily on a nightly basis.  She is not fasting.  She denies any chest pain, shortness of breath, dyspnea on exertion, vomiting, diarrhea, numbness or weakness.  No history of DVT or pulmonary embolus.    She has been noted to have a resting tachycardia at baseline.    REVIEW OF SYSTEMS  See HPI,   Remainder of ROS negative.     PAST MEDICAL HISTORY   has a past medical history of ETOH abuse (2/27/2013), HTN (hypertension) (2/27/2013), Hypertension, and Tobacco dependence (2/27/2013).    SURGICAL HISTORY   has a past surgical history that includes other orthopedic surgery (2001).    SOCIAL HISTORY  Social History     Tobacco Use   • Smoking status: Current Every Day Smoker     Packs/day: 0.10     Years: 30.00     Pack years: 3.00     Types: Cigarettes   • Smokeless tobacco: Never Used   Substance Use Topics   • Alcohol use: Yes     Alcohol/week: 14.0 oz     Types: 28 Cans of beer per week     Comment: Occasionally   • Drug use: No      Social History     Substance and Sexual Activity   Drug Use No       FAMILY HISTORY  Family History   Problem Relation Age of Onset   • Hypertension Mother    • Diabetes Mother    • Diabetes Father    • Hypertension Father    • Heart Disease Father    • Cancer Maternal Grandmother 50        breast, uterine   • Stroke Neg Hx    • Hyperlipidemia Neg Hx   "  • Genetic Disorder Neg Hx    • Lung Disease Neg Hx        CURRENT MEDICATIONS  Reviewed.  See Encounter Summary.     ALLERGIES  No Known Allergies    PHYSICAL EXAM  VITAL SIGNS: /84   Pulse 93   Temp 37 °C (98.6 °F) (Temporal)   Resp 17   Ht 1.651 m (5' 5\")   Wt 63.8 kg (140 lb 10.5 oz)   LMP 07/29/2013   SpO2 95%   BMI 23.41 kg/m²   Constitutional: Awake, alert in no apparent distress.  HENT: Normocephalic, Bilateral external ears normal. Nose normal.   Eyes: Conjunctiva normal, non-icteric, EOMI.    Thorax & Lungs: Easy unlabored respirations, Clear to ascultation bilaterally.  Cardiovascular: Regular rate, Regular rhythm, No murmurs, rubs or gallops. Bilateral pulses symmetrical.  Current heart rate 95.  Abdomen:  Soft, nontender, nondistended, normal active bowel sounds.   :    Skin: Visualized skin is  Dry, No erythema, No rash.   Musculoskeletal:   No cyanosis, clubbing or edema. No leg asymmetry.   Neurologic: Alert, Grossly non-focal.   Psychiatric: Normal affect, Normal mood  Lymphatic:  No cervical LAD    EKG   12 lead Interpreted by me  Rhythm:  Normal sinus rhythm   Rate: 99  Axis: normal  Ectopy: none  Conduction: normal  ST Segments: Less than 1 mm of depressions laterally.  T Waves: no acute change  Clinical Impression: Borderline ST depressions laterally.  Last EKG for comparison is 2009.    RADIOLOGY  No orders to display         COURSE & MEDICAL DECISION MAKING  Pertinent Labs & Imaging studies reviewed. (See chart for details)    Differential diagnoses include but are not limited to: Likely asymptomatic tachycardia    2:08 PM - Medical record reviewed, patient seen and examined at bedside.  Patient has had a resting tachycardia in the ER for the past 10 years.  EKG from the clinic today showed a tachycardia of approximately 110 with 1 mm of ST depressions in the lateral leads.    Decision Making:  This is a pleasant 55 y.o. year old female who presents with asymptomatic " tachycardia, the patient went for her routine physical examination by her primary care physician was sent here for this.  She did have an EKG in the clinic that showed some ST depressions laterally.  It was not reproduced on the EKG today on the ER.  I suspect this is rate related changes.  High-sensitivity troponin not elevated today.  The patient has not had any chest pain or shortness of breath, she is not hypoxic.  I do not feel that she requires any further work-up in the emergency department today.  The tachycardia has been seen on her prior visits dating back to 10 years ago.  Clear this is not a new phenomenon for the patient.  She has no chest pain or dyspnea on exertion, she has not had exertional chest pain either.    I recommend she follow back up with her primary care physician.  If they decide that she needs a follow-up with cardiology it can be scheduled through them.  On incidental finding the patient had a potassium of 2.9 today, this likely due to her alcohol consumption yesterday evening.  The patient does have some potassium supplements at home.  I recommend a well-balanced diet high in potassium and this should correct without difficulty.    Discharge Medications:  Discharge Medication List as of 7/28/2021  3:07 PM          The patient was discharged home (see d/c instructions) was told to return immediately for any signs or symptoms listed, or any worsening at all.  The patient verbally agreed to the discharge precautions and follow-up plan which is documented in EPIC.        FINAL IMPRESSION  1. Tachycardia

## 2021-12-15 ENCOUNTER — HOSPITAL ENCOUNTER (EMERGENCY)
Facility: MEDICAL CENTER | Age: 56
End: 2021-12-15
Attending: EMERGENCY MEDICINE | Admitting: EMERGENCY MEDICINE
Payer: MEDICAID

## 2021-12-15 VITALS
HEART RATE: 69 BPM | DIASTOLIC BLOOD PRESSURE: 78 MMHG | OXYGEN SATURATION: 98 % | BODY MASS INDEX: 23.43 KG/M2 | RESPIRATION RATE: 18 BRPM | TEMPERATURE: 98.6 F | SYSTOLIC BLOOD PRESSURE: 126 MMHG | WEIGHT: 140.65 LBS | HEIGHT: 65 IN

## 2021-12-15 DIAGNOSIS — E87.6 HYPOKALEMIA: ICD-10-CM

## 2021-12-15 DIAGNOSIS — R20.2 PARESTHESIA: ICD-10-CM

## 2021-12-15 LAB
ALBUMIN SERPL BCP-MCNC: 4.3 G/DL (ref 3.2–4.9)
ALBUMIN/GLOB SERPL: 1.8 G/DL
ALP SERPL-CCNC: 72 U/L (ref 30–99)
ALT SERPL-CCNC: 11 U/L (ref 2–50)
ANION GAP SERPL CALC-SCNC: 15 MMOL/L (ref 7–16)
APPEARANCE UR: CLEAR
AST SERPL-CCNC: 13 U/L (ref 12–45)
BASOPHILS # BLD AUTO: 0.3 % (ref 0–1.8)
BASOPHILS # BLD: 0.02 K/UL (ref 0–0.12)
BILIRUB SERPL-MCNC: 0.3 MG/DL (ref 0.1–1.5)
BILIRUB UR QL STRIP.AUTO: ABNORMAL
BUN SERPL-MCNC: 13 MG/DL (ref 8–22)
CALCIUM SERPL-MCNC: 9.5 MG/DL (ref 8.4–10.2)
CHLORIDE SERPL-SCNC: 100 MMOL/L (ref 96–112)
CO2 SERPL-SCNC: 22 MMOL/L (ref 20–33)
COLOR UR: YELLOW
CREAT SERPL-MCNC: 0.84 MG/DL (ref 0.5–1.4)
CRP SERPL HS-MCNC: 3.1 MG/L (ref 0–3)
EOSINOPHIL # BLD AUTO: 0.06 K/UL (ref 0–0.51)
EOSINOPHIL NFR BLD: 0.8 % (ref 0–6.9)
ERYTHROCYTE [DISTWIDTH] IN BLOOD BY AUTOMATED COUNT: 43 FL (ref 35.9–50)
GLOBULIN SER CALC-MCNC: 2.4 G/DL (ref 1.9–3.5)
GLUCOSE SERPL-MCNC: 95 MG/DL (ref 65–99)
GLUCOSE UR STRIP.AUTO-MCNC: NEGATIVE MG/DL
HCT VFR BLD AUTO: 36.4 % (ref 37–47)
HGB BLD-MCNC: 12.4 G/DL (ref 12–16)
IMM GRANULOCYTES # BLD AUTO: 0.02 K/UL (ref 0–0.11)
IMM GRANULOCYTES NFR BLD AUTO: 0.3 % (ref 0–0.9)
KETONES UR STRIP.AUTO-MCNC: NEGATIVE MG/DL
LEUKOCYTE ESTERASE UR QL STRIP.AUTO: NEGATIVE
LYMPHOCYTES # BLD AUTO: 2.29 K/UL (ref 1–4.8)
LYMPHOCYTES NFR BLD: 30.6 % (ref 22–41)
MCH RBC QN AUTO: 34 PG (ref 27–33)
MCHC RBC AUTO-ENTMCNC: 34.1 G/DL (ref 33.6–35)
MCV RBC AUTO: 99.7 FL (ref 81.4–97.8)
MICRO URNS: ABNORMAL
MONOCYTES # BLD AUTO: 0.53 K/UL (ref 0–0.85)
MONOCYTES NFR BLD AUTO: 7.1 % (ref 0–13.4)
NEUTROPHILS # BLD AUTO: 4.56 K/UL (ref 2–7.15)
NEUTROPHILS NFR BLD: 60.9 % (ref 44–72)
NITRITE UR QL STRIP.AUTO: NEGATIVE
NRBC # BLD AUTO: 0 K/UL
NRBC BLD-RTO: 0 /100 WBC
PH UR STRIP.AUTO: 5 [PH] (ref 5–8)
PLATELET # BLD AUTO: 257 K/UL (ref 164–446)
PMV BLD AUTO: 9.1 FL (ref 9–12.9)
POTASSIUM SERPL-SCNC: 3 MMOL/L (ref 3.6–5.5)
PROT SERPL-MCNC: 6.7 G/DL (ref 6–8.2)
PROT UR QL STRIP: NEGATIVE MG/DL
RBC # BLD AUTO: 3.65 M/UL (ref 4.2–5.4)
RBC UR QL AUTO: NEGATIVE
SODIUM SERPL-SCNC: 137 MMOL/L (ref 135–145)
SP GR UR STRIP.AUTO: >=1.03
WBC # BLD AUTO: 7.5 K/UL (ref 4.8–10.8)

## 2021-12-15 PROCEDURE — 86141 C-REACTIVE PROTEIN HS: CPT

## 2021-12-15 PROCEDURE — 85025 COMPLETE CBC W/AUTO DIFF WBC: CPT

## 2021-12-15 PROCEDURE — A9270 NON-COVERED ITEM OR SERVICE: HCPCS | Performed by: EMERGENCY MEDICINE

## 2021-12-15 PROCEDURE — 700102 HCHG RX REV CODE 250 W/ 637 OVERRIDE(OP): Performed by: EMERGENCY MEDICINE

## 2021-12-15 PROCEDURE — 81003 URINALYSIS AUTO W/O SCOPE: CPT

## 2021-12-15 PROCEDURE — 99283 EMERGENCY DEPT VISIT LOW MDM: CPT

## 2021-12-15 PROCEDURE — 80053 COMPREHEN METABOLIC PANEL: CPT

## 2021-12-15 RX ORDER — POTASSIUM CHLORIDE 20 MEQ/1
20 TABLET, EXTENDED RELEASE ORAL ONCE
Status: COMPLETED | OUTPATIENT
Start: 2021-12-15 | End: 2021-12-15

## 2021-12-15 RX ORDER — FLUTICASONE PROPIONATE 50 MCG
1 SPRAY, SUSPENSION (ML) NASAL PRN
Status: SHIPPED | COMMUNITY
End: 2022-10-16

## 2021-12-15 RX ADMIN — POTASSIUM CHLORIDE 20 MEQ: 1500 TABLET, EXTENDED RELEASE ORAL at 13:29

## 2021-12-15 ASSESSMENT — FIBROSIS 4 INDEX: FIB4 SCORE: 1.53

## 2021-12-15 NOTE — ED TRIAGE NOTES
"Chief Complaint   Patient presents with   • Blurred Vision     Pt states has intermittent blurred vision   • Tingling     Left arm x 2 weeks     /87   Pulse 96   Temp 36.3 °C (97.4 °F)   Resp 15   Ht 1.651 m (5' 5\")   Wt 63.8 kg (140 lb 10.5 oz)   LMP 07/29/2013   SpO2 98%   BMI 23.41 kg/m²     Has this patient been vaccinated for COVID YES  If not, would they like to be vaccinated while in the ER if eligible?  NA  Would the patient like to speak with the ERP about the possibility of receiving the COVID vaccine today before making a decision? NA        "

## 2021-12-15 NOTE — ED PROVIDER NOTES
ED Provider Note    CHIEF COMPLAINT  Chief Complaint   Patient presents with   • Blurred Vision     Pt states has intermittent blurred vision   • Tingling     Left arm x 2 weeks       HPI  Tatyana Dallas is a 56 y.o. female who presents stating that she has had some left arm paresthesias over the course the last 2 weeks.  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  States that she has not had any swelling and it is not this way all the time.  Denies any fever, chills, sweats, headache, neck stiffness.  She has had some intermittent blurred vision this morning that occurred while at work but seemed to improve fairly quickly and she does wear glasses.  She says that she is symptom-free at this time.  Does smoke 1 cigarette a day and denies any previous stroke.  Does have a family history of diabetes but says that she does not have any problems with blood sugar    REVIEW OF SYSTEMS  See HPI for further details. All other systems are negative.     PAST MEDICAL HISTORY  Past Medical History:   Diagnosis Date   • ETOH abuse 2/27/2013   • HTN (hypertension) 2/27/2013   • Hypertension    • Tobacco dependence 2/27/2013       FAMILY HISTORY  Family History   Problem Relation Age of Onset   • Hypertension Mother    • Diabetes Mother    • Diabetes Father    • Hypertension Father    • Heart Disease Father    • Cancer Maternal Grandmother 50        breast, uterine   • Stroke Neg Hx    • Hyperlipidemia Neg Hx    • Genetic Disorder Neg Hx    • Lung Disease Neg Hx        SOCIAL HISTORY   reports that she has been smoking cigarettes. She has a 3.00 pack-year smoking history. She has never used smokeless tobacco. She reports current alcohol use of about 14.0 oz of alcohol per week. She reports that she does not use drugs.    SURGICAL HISTORY  Past Surgical History:   Procedure Laterality Date   • OTHER ORTHOPEDIC SURGERY  2001    ankle       CURRENT MEDICATIONS  Home Medications    **Home  "medications have not yet been reviewed for this encounter**         ALLERGIES  No Known Allergies    PHYSICAL EXAM  VITAL SIGNS: /87   Pulse 96   Temp 36.3 °C (97.4 °F)   Resp 15   Ht 1.651 m (5' 5\")   Wt 63.8 kg (140 lb 10.5 oz)   LMP 07/29/2013   SpO2 98%   BMI 23.41 kg/m²    Constitutional: Well developed, Well nourished, No acute distress, Non-toxic appearance.   HENT: Normocephalic, Atraumatic, Bilateral external ears normal, Oropharynx is clear mucous membranes are moist. No oral exudates or nasal discharge.   Eyes: Pupils are equal round and reactive, EOMI, Conjunctiva normal, No discharge.   Neck: Normal range of motion, No tenderness, Supple, No stridor. No meningismus.  Lymphatic: No lymphadenopathy noted.   Cardiovascular: Regular rate and rhythm without murmur rub or gallop.  Thorax & Lungs: Clear breath sounds bilaterally without wheezes, rhonchi or rales. There is no chest wall tenderness.   Abdomen: Soft non-tender non-distended. There is no rebound or guarding. No organomegaly is appreciated. Bowel sounds are normal.  Skin: Normal without rash.   Back: No CVA or spinal tenderness.   Extremities: Intact distal pulses, No edema, No tenderness, No cyanosis, No clubbing. Capillary refill is less than 2 seconds.  Musculoskeletal: Good range of motion in all major joints. No tenderness to palpation or major deformities noted.   Neurologic: Alert & oriented x 3, Normal motor function, Normal sensory function, No focal deficits noted. Reflexes are normal.  Psychiatric: Affect normal, Judgment normal, Mood normal. There is no suicidal ideation or patient reported hallucinations.       COURSE & MEDICAL DECISION MAKING  Pertinent Labs & Imaging studies reviewed. (See chart for details)  The patient has some concerning neurologic symptoms and therefore I did some lab work that shows hypokalemia with a potassium of 3.0.  She chronically runs low on her potassium and says that she does not like green " things and does not like to get her potassium through diet.    She also slightly anemic with a hemoglobin of 12.4 but otherwise no evidence of leukocytosis or shift and no electrolyte derangements other than potassium and no acidosis or renal dysfunction.  She was concerned about the possibility of a UTI and this is negative    I had a conversation with her about correcting her potassium through diet.  I did give her 1 potassium tablet here and suggest that she sees her doctor given a C-reactive protein of 3.1 as she may have the beginnings of multiple sclerosis but MRI of the brain should be done only if symptoms persist and as an outpatient.    Patient is discharged in stable condition symptom-free without blurred vision or significant paresthesias the left arm    FINAL IMPRESSION  1. Paresthesia    2. Hypokalemia             Electronically signed by: True Shaffer M.D., 12/15/2021 12:20 PM

## 2021-12-15 NOTE — DISCHARGE INSTRUCTIONS
You may need an outpatient MRI to rule out multiple sclerosis if your symptoms persist.  Please follow-up with Dr. Pan at the [facility] and take a diet rich in potassium containing foods which may improve your symptoms considerably

## 2021-12-15 NOTE — ED NOTES
Med rec updated and complete  Allergies reviewed  Pt reports no vitamins   Pt reports no antibiotics in the last 30 days.    No current facility-administered medications on file prior to encounter.     Current Outpatient Medications on File Prior to Encounter   Medication Sig Dispense Refill   • Acetaminophen (TYLENOL PO) Take 2 Tablets by mouth every day. Pt is not sure the strength     • fluticasone (FLONASE) 50 MCG/ACT nasal spray Administer 1 Spray into affected nostril(S) as needed (For congestion).     • amLODIPine (NORVASC) 10 MG Tab Take 10 mg by mouth every day.

## 2022-10-16 ENCOUNTER — HOSPITAL ENCOUNTER (EMERGENCY)
Facility: MEDICAL CENTER | Age: 57
End: 2022-10-16
Attending: EMERGENCY MEDICINE
Payer: MEDICAID

## 2022-10-16 ENCOUNTER — APPOINTMENT (OUTPATIENT)
Dept: RADIOLOGY | Facility: MEDICAL CENTER | Age: 57
End: 2022-10-16
Attending: EMERGENCY MEDICINE
Payer: MEDICAID

## 2022-10-16 VITALS
HEIGHT: 65 IN | RESPIRATION RATE: 20 BRPM | BODY MASS INDEX: 22.63 KG/M2 | SYSTOLIC BLOOD PRESSURE: 151 MMHG | OXYGEN SATURATION: 98 % | DIASTOLIC BLOOD PRESSURE: 107 MMHG | TEMPERATURE: 97.3 F | WEIGHT: 135.8 LBS | HEART RATE: 98 BPM

## 2022-10-16 DIAGNOSIS — R11.0 NAUSEA: ICD-10-CM

## 2022-10-16 DIAGNOSIS — R07.9 CHEST PAIN, UNSPECIFIED TYPE: ICD-10-CM

## 2022-10-16 DIAGNOSIS — R06.02 SOB (SHORTNESS OF BREATH): ICD-10-CM

## 2022-10-16 DIAGNOSIS — B34.9 VIRAL SYNDROME: ICD-10-CM

## 2022-10-16 DIAGNOSIS — E87.6 HYPOKALEMIA: ICD-10-CM

## 2022-10-16 LAB
ALBUMIN SERPL BCP-MCNC: 4.4 G/DL (ref 3.2–4.9)
ALBUMIN/GLOB SERPL: 1.6 G/DL
ALP SERPL-CCNC: 81 U/L (ref 30–99)
ALT SERPL-CCNC: 11 U/L (ref 2–50)
ANION GAP SERPL CALC-SCNC: 17 MMOL/L (ref 7–16)
APPEARANCE UR: CLEAR
AST SERPL-CCNC: 16 U/L (ref 12–45)
BACTERIA #/AREA URNS HPF: ABNORMAL /HPF
BASOPHILS # BLD AUTO: 0.2 % (ref 0–1.8)
BASOPHILS # BLD: 0.02 K/UL (ref 0–0.12)
BILIRUB SERPL-MCNC: 0.9 MG/DL (ref 0.1–1.5)
BILIRUB UR QL STRIP.AUTO: ABNORMAL
BUN SERPL-MCNC: 19 MG/DL (ref 8–22)
CALCIUM SERPL-MCNC: 9.4 MG/DL (ref 8.4–10.2)
CHLORIDE SERPL-SCNC: 96 MMOL/L (ref 96–112)
CO2 SERPL-SCNC: 24 MMOL/L (ref 20–33)
COLOR UR: YELLOW
CREAT SERPL-MCNC: 0.7 MG/DL (ref 0.5–1.4)
D DIMER PPP IA.FEU-MCNC: <0.27 UG/ML (FEU) (ref 0–0.5)
EKG IMPRESSION: NORMAL
EOSINOPHIL # BLD AUTO: 0.05 K/UL (ref 0–0.51)
EOSINOPHIL NFR BLD: 0.4 % (ref 0–6.9)
EPI CELLS #/AREA URNS HPF: ABNORMAL /HPF
ERYTHROCYTE [DISTWIDTH] IN BLOOD BY AUTOMATED COUNT: 42 FL (ref 35.9–50)
FLUAV RNA SPEC QL NAA+PROBE: NEGATIVE
FLUBV RNA SPEC QL NAA+PROBE: NEGATIVE
GFR SERPLBLD CREATININE-BSD FMLA CKD-EPI: 102 ML/MIN/1.73 M 2
GLOBULIN SER CALC-MCNC: 2.7 G/DL (ref 1.9–3.5)
GLUCOSE SERPL-MCNC: 107 MG/DL (ref 65–99)
GLUCOSE UR STRIP.AUTO-MCNC: NEGATIVE MG/DL
HCT VFR BLD AUTO: 39.7 % (ref 37–47)
HGB BLD-MCNC: 14.2 G/DL (ref 12–16)
HYALINE CASTS #/AREA URNS LPF: ABNORMAL /LPF
IMM GRANULOCYTES # BLD AUTO: 0.04 K/UL (ref 0–0.11)
IMM GRANULOCYTES NFR BLD AUTO: 0.4 % (ref 0–0.9)
KETONES UR STRIP.AUTO-MCNC: 15 MG/DL
LACTATE SERPL-SCNC: 1.5 MMOL/L (ref 0.5–2)
LEUKOCYTE ESTERASE UR QL STRIP.AUTO: NEGATIVE
LYMPHOCYTES # BLD AUTO: 2.51 K/UL (ref 1–4.8)
LYMPHOCYTES NFR BLD: 22.2 % (ref 22–41)
MCH RBC QN AUTO: 34.4 PG (ref 27–33)
MCHC RBC AUTO-ENTMCNC: 35.8 G/DL (ref 33.6–35)
MCV RBC AUTO: 96.1 FL (ref 81.4–97.8)
MICRO URNS: ABNORMAL
MONOCYTES # BLD AUTO: 0.62 K/UL (ref 0–0.85)
MONOCYTES NFR BLD AUTO: 5.5 % (ref 0–13.4)
MUCOUS THREADS #/AREA URNS HPF: ABNORMAL /HPF
NEUTROPHILS # BLD AUTO: 8.07 K/UL (ref 2–7.15)
NEUTROPHILS NFR BLD: 71.3 % (ref 44–72)
NITRITE UR QL STRIP.AUTO: NEGATIVE
NRBC # BLD AUTO: 0 K/UL
NRBC BLD-RTO: 0 /100 WBC
NT-PROBNP SERPL IA-MCNC: 50 PG/ML (ref 0–125)
PH UR STRIP.AUTO: 5.5 [PH] (ref 5–8)
PLATELET # BLD AUTO: 308 K/UL (ref 164–446)
PMV BLD AUTO: 9 FL (ref 9–12.9)
POTASSIUM SERPL-SCNC: 2.9 MMOL/L (ref 3.6–5.5)
PROT SERPL-MCNC: 7.1 G/DL (ref 6–8.2)
PROT UR QL STRIP: 30 MG/DL
RBC # BLD AUTO: 4.13 M/UL (ref 4.2–5.4)
RBC # URNS HPF: ABNORMAL /HPF
RBC UR QL AUTO: NEGATIVE
RSV RNA SPEC QL NAA+PROBE: NEGATIVE
SARS-COV-2 RNA RESP QL NAA+PROBE: NOTDETECTED
SODIUM SERPL-SCNC: 137 MMOL/L (ref 135–145)
SP GR UR STRIP.AUTO: >=1.03
SPECIMEN SOURCE: NORMAL
TROPONIN T SERPL-MCNC: 9 NG/L (ref 6–19)
TSH SERPL DL<=0.005 MIU/L-ACNC: 1.4 UIU/ML (ref 0.38–5.33)
WBC # BLD AUTO: 11.3 K/UL (ref 4.8–10.8)
WBC #/AREA URNS HPF: ABNORMAL /HPF

## 2022-10-16 PROCEDURE — 71045 X-RAY EXAM CHEST 1 VIEW: CPT

## 2022-10-16 PROCEDURE — 85379 FIBRIN DEGRADATION QUANT: CPT

## 2022-10-16 PROCEDURE — C9803 HOPD COVID-19 SPEC COLLECT: HCPCS | Performed by: EMERGENCY MEDICINE

## 2022-10-16 PROCEDURE — 83605 ASSAY OF LACTIC ACID: CPT

## 2022-10-16 PROCEDURE — 84443 ASSAY THYROID STIM HORMONE: CPT

## 2022-10-16 PROCEDURE — 36415 COLL VENOUS BLD VENIPUNCTURE: CPT

## 2022-10-16 PROCEDURE — 0241U HCHG SARS-COV-2 COVID-19 NFCT DS RESP RNA 4 TRGT MIC: CPT

## 2022-10-16 PROCEDURE — 81001 URINALYSIS AUTO W/SCOPE: CPT

## 2022-10-16 PROCEDURE — 700102 HCHG RX REV CODE 250 W/ 637 OVERRIDE(OP): Performed by: EMERGENCY MEDICINE

## 2022-10-16 PROCEDURE — 99285 EMERGENCY DEPT VISIT HI MDM: CPT

## 2022-10-16 PROCEDURE — 87040 BLOOD CULTURE FOR BACTERIA: CPT | Mod: 91

## 2022-10-16 PROCEDURE — 93005 ELECTROCARDIOGRAM TRACING: CPT | Performed by: EMERGENCY MEDICINE

## 2022-10-16 PROCEDURE — 96374 THER/PROPH/DIAG INJ IV PUSH: CPT

## 2022-10-16 PROCEDURE — 83880 ASSAY OF NATRIURETIC PEPTIDE: CPT

## 2022-10-16 PROCEDURE — 85025 COMPLETE CBC W/AUTO DIFF WBC: CPT

## 2022-10-16 PROCEDURE — 87086 URINE CULTURE/COLONY COUNT: CPT

## 2022-10-16 PROCEDURE — 700111 HCHG RX REV CODE 636 W/ 250 OVERRIDE (IP): Performed by: EMERGENCY MEDICINE

## 2022-10-16 PROCEDURE — A9270 NON-COVERED ITEM OR SERVICE: HCPCS | Performed by: EMERGENCY MEDICINE

## 2022-10-16 PROCEDURE — 80053 COMPREHEN METABOLIC PANEL: CPT

## 2022-10-16 PROCEDURE — 84484 ASSAY OF TROPONIN QUANT: CPT

## 2022-10-16 PROCEDURE — 94760 N-INVAS EAR/PLS OXIMETRY 1: CPT

## 2022-10-16 RX ORDER — ONDANSETRON 4 MG/1
4 TABLET, ORALLY DISINTEGRATING ORAL EVERY 8 HOURS PRN
Qty: 20 TABLET | Refills: 0 | Status: SHIPPED | OUTPATIENT
Start: 2022-10-16 | End: 2023-04-10

## 2022-10-16 RX ORDER — POTASSIUM CHLORIDE 20 MEQ/1
40 TABLET, EXTENDED RELEASE ORAL ONCE
Status: COMPLETED | OUTPATIENT
Start: 2022-10-16 | End: 2022-10-16

## 2022-10-16 RX ORDER — KETOROLAC TROMETHAMINE 30 MG/ML
30 INJECTION, SOLUTION INTRAMUSCULAR; INTRAVENOUS ONCE
Status: COMPLETED | OUTPATIENT
Start: 2022-10-16 | End: 2022-10-16

## 2022-10-16 RX ORDER — POTASSIUM CHLORIDE 20 MEQ/1
20 TABLET, EXTENDED RELEASE ORAL 2 TIMES DAILY
Qty: 20 TABLET | Refills: 0 | Status: SHIPPED | OUTPATIENT
Start: 2022-10-16 | End: 2022-10-26

## 2022-10-16 RX ORDER — IBUPROFEN 200 MG
400 TABLET ORAL EVERY 6 HOURS PRN
Status: SHIPPED | COMMUNITY
End: 2024-01-29

## 2022-10-16 RX ADMIN — POTASSIUM CHLORIDE 40 MEQ: 1500 TABLET, EXTENDED RELEASE ORAL at 12:09

## 2022-10-16 RX ADMIN — KETOROLAC TROMETHAMINE 30 MG: 30 INJECTION, SOLUTION INTRAMUSCULAR; INTRAVENOUS at 12:09

## 2022-10-16 ASSESSMENT — FIBROSIS 4 INDEX: FIB4 SCORE: 0.84

## 2022-10-16 NOTE — ED NOTES
Patient given discharge instructions questions and concerns addressed.  Patient ambulated out with family

## 2022-10-16 NOTE — ED NOTES
ERP at bedside. Pt agrees with plan of care discussed by ERP. AIDET acknowledged with patient. Km in low position, side rail up for pt safety. Call light within reach. Will continue to monitor.

## 2022-10-16 NOTE — ED PROVIDER NOTES
"ED Provider Note    Scribed for Mark Pettit M.D. by Gopal Chavez. 10/16/2022  10:00 AM    Primary care provider: Rashmi Sigala M.D.  Means of arrival: Walk-in  History obtained from: Patient   History limited by: None    CHIEF COMPLAINT  Chief Complaint   Patient presents with    Chest Pain       HPI  Tatyana Dallas is a 57 y.o. female who presents to the Emergency Department for evaluation of chest pain onset four days ago. Patient reports that  she has been having problems catching her breath, and states that this feels like one of her lungs is not working. She describes this as a \"pressure\" on her chest, and adds that this morning it felt like there was \"a ball stuck in her throat\". Patient adds that she has been experiencing associated shortness of breath, chills, diaphoresis, loss of sleep, headache, cough, and vomiting. Patient reports that she has not been able to sleep at all for the past several days, because she will wake up every 45 minutes with a headache, cough, and diaphoresis. She mentions that she has attempted to see a specialist for sleep apnea in the past, but was denied due to her insurance. She denies any fevers, abdominal pain, leg pain, leg swelling, dysuria, or hematuria. Patient denies any history of blood clots, diabetes, or previous myocardial infarction. Patient is vaccinated for COVID-19. She adds that she is regularly tachycardic. Patient admits to drinking alcohol yesterday, and states that she drinks fairly often, but denies any withdrawals at this time.     REVIEW OF SYSTEMS  Pertinent positives include Chest pressure, shortness of breath, chills, diaphoresis, loss of sleep, headache, cough, and vomiting. Pertinent negatives include no fevers, abdominal pain, leg pain, leg swelling, dysuria, or hematuria.  All other systems reviewed and negative.    PAST MEDICAL HISTORY   has a past medical history of ETOH abuse (2/27/2013), HTN (hypertension) (2/27/2013), Hypertension, " "and Tobacco dependence (2/27/2013).    SURGICAL HISTORY   has a past surgical history that includes other orthopedic surgery (2001).    SOCIAL HISTORY  Social History     Tobacco Use    Smoking status: Every Day     Packs/day: 0.10     Years: 30.00     Pack years: 3.00     Types: Cigarettes    Smokeless tobacco: Never   Substance Use Topics    Alcohol use: Yes     Alcohol/week: 14.0 oz     Types: 28 Cans of beer per week    Drug use: No      Social History     Substance and Sexual Activity   Drug Use No       FAMILY HISTORY  Family History   Problem Relation Age of Onset    Hypertension Mother     Diabetes Mother     Diabetes Father     Hypertension Father     Heart Disease Father     Cancer Maternal Grandmother 50        breast, uterine    Stroke Neg Hx     Hyperlipidemia Neg Hx     Genetic Disorder Neg Hx     Lung Disease Neg Hx        CURRENT MEDICATIONS  Home Medications       Reviewed by Arianna Alexander (Pharmacy Tech) on 10/16/22 at 1030  Med List Status: Complete     Medication Last Dose Status   amLODIPine (NORVASC) 10 MG Tab 10/15/2022 Active   ibuprofen (MOTRIN) 200 MG Tab 10/16/2022 Active                    ALLERGIES  No Known Allergies    PHYSICAL EXAM  VITAL SIGNS: BP (!) 138/96   Pulse (!) 109   Temp 36 °C (96.8 °F) (Temporal)   Resp 16   Ht 1.651 m (5' 5\")   Wt 61.6 kg (135 lb 12.9 oz)   LMP 07/29/2013   SpO2 98%   BMI 22.60 kg/m²     Constitutional: Well developed, Well nourished, Moderate distress, Non-toxic appearance.   HENT: Normocephalic, Atraumatic, Bilateral external ears normal, Oropharynx moist, Difficult to visualize posterior oropharynx, No oral exudates.   Eyes: PERRLA, EOMI, Conjunctiva normal, No discharge.   Neck: No tenderness, Supple, No stridor.   Lymphatic: No lymphadenopathy noted.   Cardiovascular: Tachycardic with Normal rhythm.   Thorax & Lungs: Clear to auscultation bilaterally, No respiratory distress, No wheezing, No crackles.   Abdomen: Soft, No tenderness, No " masses, No pulsatile masses.   Skin: Warm, Dry, No erythema, No rash.   Extremities:, No edema No cyanosis. No chords.   Musculoskeletal: No tenderness to palpation or major deformities noted.  Intact distal pulses  Neurologic: Awake, alert. Moves all extremities spontaneously.  Psychiatric: Affect normal, Judgment normal, Mood normal.     mild distress  extrem no chrods. diff: viral covid, aACs, PE    LABS  Results for orders placed or performed during the hospital encounter of 10/16/22   LACTIC ACID   Result Value Ref Range    Lactic Acid 1.5 0.5 - 2.0 mmol/L   CBC WITH DIFFERENTIAL   Result Value Ref Range    WBC 11.3 (H) 4.8 - 10.8 K/uL    RBC 4.13 (L) 4.20 - 5.40 M/uL    Hemoglobin 14.2 12.0 - 16.0 g/dL    Hematocrit 39.7 37.0 - 47.0 %    MCV 96.1 81.4 - 97.8 fL    MCH 34.4 (H) 27.0 - 33.0 pg    MCHC 35.8 (H) 33.6 - 35.0 g/dL    RDW 42.0 35.9 - 50.0 fL    Platelet Count 308 164 - 446 K/uL    MPV 9.0 9.0 - 12.9 fL    Neutrophils-Polys 71.30 44.00 - 72.00 %    Lymphocytes 22.20 22.00 - 41.00 %    Monocytes 5.50 0.00 - 13.40 %    Eosinophils 0.40 0.00 - 6.90 %    Basophils 0.20 0.00 - 1.80 %    Immature Granulocytes 0.40 0.00 - 0.90 %    Nucleated RBC 0.00 /100 WBC    Neutrophils (Absolute) 8.07 (H) 2.00 - 7.15 K/uL    Lymphs (Absolute) 2.51 1.00 - 4.80 K/uL    Monos (Absolute) 0.62 0.00 - 0.85 K/uL    Eos (Absolute) 0.05 0.00 - 0.51 K/uL    Baso (Absolute) 0.02 0.00 - 0.12 K/uL    Immature Granulocytes (abs) 0.04 0.00 - 0.11 K/uL    NRBC (Absolute) 0.00 K/uL   COMP METABOLIC PANEL   Result Value Ref Range    Sodium 137 135 - 145 mmol/L    Potassium 2.9 (L) 3.6 - 5.5 mmol/L    Chloride 96 96 - 112 mmol/L    Co2 24 20 - 33 mmol/L    Anion Gap 17.0 (H) 7.0 - 16.0    Glucose 107 (H) 65 - 99 mg/dL    Bun 19 8 - 22 mg/dL    Creatinine 0.70 0.50 - 1.40 mg/dL    Calcium 9.4 8.4 - 10.2 mg/dL    AST(SGOT) 16 12 - 45 U/L    ALT(SGPT) 11 2 - 50 U/L    Alkaline Phosphatase 81 30 - 99 U/L    Total Bilirubin 0.9 0.1 - 1.5 mg/dL     Albumin 4.4 3.2 - 4.9 g/dL    Total Protein 7.1 6.0 - 8.2 g/dL    Globulin 2.7 1.9 - 3.5 g/dL    A-G Ratio 1.6 g/dL   URINALYSIS    Specimen: Urine   Result Value Ref Range    Color Yellow     Character Clear     Specific Gravity >=1.030 <1.035    Ph 5.5 5.0 - 8.0    Glucose Negative Negative mg/dL    Ketones 15 (A) Negative mg/dL    Protein 30 (A) Negative mg/dL    Bilirubin Moderate (A) Negative    Nitrite Negative Negative    Leukocyte Esterase Negative Negative    Occult Blood Negative Negative    Micro Urine Req Microscopic    Troponin   Result Value Ref Range    Troponin T 9 6 - 19 ng/L   proBrain Natriuretic Peptide, NT   Result Value Ref Range    NT-proBNP 50 0 - 125 pg/mL   D-DIMER   Result Value Ref Range    D-Dimer Screen <0.27 0.00 - 0.50 ug/mL (FEU)   CoV-2, FLU A/B, and RSV by PCR (2-4 Hours CEPHEID) : Collect NP swab in VTM    Specimen: Respirate   Result Value Ref Range    Influenza virus A RNA Negative Negative    Influenza virus B, PCR Negative Negative    RSV, PCR Negative Negative    SARS-CoV-2 by PCR NotDetected     SARS-CoV-2 Source Nasal Swab    TSH WITH REFLEX TO FT4   Result Value Ref Range    TSH 1.400 0.380 - 5.330 uIU/mL   URINE MICROSCOPIC (W/UA)   Result Value Ref Range    WBC 2-5 /hpf    RBC 0-2 /hpf    Bacteria Rare (A) None /hpf    Epithelial Cells Few Few /hpf    Mucous Threads Moderate /hpf    Hyaline Cast 3-5 (A) /lpf   ESTIMATED GFR   Result Value Ref Range    GFR (CKD-EPI) 102 >60 mL/min/1.73 m 2   EKG   Result Value Ref Range    Report       [facility] Emergency Dept.    Test Date:  2022-10-16  Pt Name:    EDMUND OSORIO                 Department: [facility]  MRN:        [id number]                      Room:  Gender:     Female                       Technician: [id number]  :        1965                   Requested By:ER TRIAGE PROTOCOL  Order #:    [id number]                    Reading MD: ILANA CORREA MD    Measurements  Intervals                                 Axis  Rate:       105                          P:          64  OK:         129                          QRS:        29  QRSD:       89                           T:          68  QT:         316  QTc:        418    Interpretive Statements  Sinus tachycardia  Probable left atrial enlargement  Abnormal inferior Q waves  Minimal ST depression, diffuse leads  Compared to ECG 07/28/2021 13:10:37  Sinus rhythm no longer present  ST (T wave) deviation still present  Electronically Signed On 10- 12:29:57 PDT by ILANA CORREA MD        EKG INTERPRETATION  rate 105  Normal P waves and normal QRS  Slight diffuse ST depression  Overall sinus tachycardia, nonspecific EKG.     12 Lead EKG: interpreted by me as above.     RADIOLOGY  DX-CHEST-PORTABLE (1 VIEW)   Final Result      No acute cardiac or pulmonary abnormalities are identified.        The radiologist's interpretation of all radiological studies have been reviewed by me.      COURSE & MEDICAL DECISION MAKING  Pertinent Labs & Imaging studies reviewed. (See chart for details)    10:00 AM - Patient seen and examined at bedside. Patient reports with chest pressure. Her associated symptoms are causing loss of sleep as noted in the HPI. Ordered EKG, CoV-2, FLU A/B, and RSV by PCR, D-Dimer, proBrain Natriuretic peptide NT, Troponin, Blood culture, Urine culture, UA, CMP, CBC with differential, Lactic acid, and DX-Chest to evaluate her symptoms. The differential diagnoses include but are not limited to: Viral infection vs. COVID-19 vs. ACS vs. Pulmonary embolism.     12:34 PM - Patient was reevaluated at bedside. Discussed lab and radiology results with the patient and informed them that their potassium is low. I discussed my plan for discharge with the patient including medication use. Patient verbalizes understanding and agreement to this plan of care.        Decision Making:  Patient with URI type symptoms, chest pain, shortness of breath.  Extensive  work-up here including setting a negative D-dimer that ruled out PE.  Chest x-ray does not show any pneumonia.  Laboratory tests do not support any ACS.  The patient's COVID is negative.  I believe the patient symptoms are likely viral in etiology.  Patient does have low potassium, will give the patient a prescription for potassium, the patient is also having some nausea therefore I will give the patient a prescription for nausea medicines, have the patient return with worsening symptoms.      The patient will return for new or worsening symptoms and is stable at the time of discharge.    The patient is referred to a primary physician for blood pressure management, diabetic screening, and for all other preventative health concerns.    DISPOSITION:  Patient will be discharged home in stable condition.    FOLLOW UP:  [facility] Emergency Dept  [address]  [place] [id number] [phone]    If symptoms worsen      OUTPATIENT MEDICATIONS:  Discharge Medication List as of 10/16/2022 12:45 PM        START taking these medications    Details   ondansetron (ZOFRAN ODT) 4 MG TABLET DISPERSIBLE Take 1 Tablet by mouth every 8 hours as needed for Nausea., Disp-20 Tablet, R-0, Normal      potassium chloride SA (KDUR) 20 MEQ Tab CR Take 1 Tablet by mouth 2 times a day for 10 days., Disp-20 Tablet, R-0, Normal              FINAL IMPRESSION  1. Chest pain, unspecified type    2. SOB (shortness of breath)    3. Viral syndrome    4. Nausea    5. Hypokalemia          Gopal RODRIGUEZ (Grace), am scribing for, and in the presence of, Mark Pettit M.D..    Electronically signed by: Gopal Chavez (Grace), 10/16/2022    Mark RODRIGUEZ M.D. personally performed the services described in this documentation, as scribed by Gopal Chavez in my presence, and it is both accurate and complete.    The note accurately reflects work and decisions made by me.  Mark Pettit M.D.   10/16/2022  3:51 PM

## 2022-10-18 LAB
BACTERIA UR CULT: NORMAL
SIGNIFICANT IND 70042: NORMAL
SITE SITE: NORMAL
SOURCE SOURCE: NORMAL

## 2022-10-21 LAB
BACTERIA BLD CULT: NORMAL
BACTERIA BLD CULT: NORMAL
SIGNIFICANT IND 70042: NORMAL
SIGNIFICANT IND 70042: NORMAL
SITE SITE: NORMAL
SITE SITE: NORMAL
SOURCE SOURCE: NORMAL
SOURCE SOURCE: NORMAL

## 2023-04-10 ENCOUNTER — HOSPITAL ENCOUNTER (EMERGENCY)
Facility: MEDICAL CENTER | Age: 58
End: 2023-04-10
Attending: EMERGENCY MEDICINE
Payer: MEDICAID

## 2023-04-10 ENCOUNTER — APPOINTMENT (OUTPATIENT)
Dept: RADIOLOGY | Facility: MEDICAL CENTER | Age: 58
End: 2023-04-10
Attending: EMERGENCY MEDICINE
Payer: MEDICAID

## 2023-04-10 VITALS
TEMPERATURE: 98.1 F | SYSTOLIC BLOOD PRESSURE: 130 MMHG | BODY MASS INDEX: 21.6 KG/M2 | DIASTOLIC BLOOD PRESSURE: 78 MMHG | OXYGEN SATURATION: 94 % | HEART RATE: 111 BPM | HEIGHT: 65 IN | WEIGHT: 129.63 LBS | RESPIRATION RATE: 16 BRPM

## 2023-04-10 DIAGNOSIS — R74.01 TRANSAMINITIS: ICD-10-CM

## 2023-04-10 DIAGNOSIS — R05.2 SUBACUTE COUGH: ICD-10-CM

## 2023-04-10 LAB
ALBUMIN SERPL BCP-MCNC: 4.5 G/DL (ref 3.2–4.9)
ALBUMIN/GLOB SERPL: 1.7 G/DL
ALP SERPL-CCNC: 96 U/L (ref 30–99)
ALT SERPL-CCNC: 52 U/L (ref 2–50)
ANION GAP SERPL CALC-SCNC: 14 MMOL/L (ref 7–16)
APPEARANCE UR: CLEAR
AST SERPL-CCNC: 70 U/L (ref 12–45)
BASOPHILS # BLD AUTO: 0.1 % (ref 0–1.8)
BASOPHILS # BLD: 0.01 K/UL (ref 0–0.12)
BILIRUB SERPL-MCNC: 0.3 MG/DL (ref 0.1–1.5)
BILIRUB UR QL STRIP.AUTO: ABNORMAL
BUN SERPL-MCNC: 15 MG/DL (ref 8–22)
CALCIUM ALBUM COR SERPL-MCNC: 9.4 MG/DL (ref 8.5–10.5)
CALCIUM SERPL-MCNC: 9.8 MG/DL (ref 8.4–10.2)
CHLORIDE SERPL-SCNC: 98 MMOL/L (ref 96–112)
CO2 SERPL-SCNC: 22 MMOL/L (ref 20–33)
COLOR UR: YELLOW
CREAT SERPL-MCNC: 0.76 MG/DL (ref 0.5–1.4)
D DIMER PPP IA.FEU-MCNC: <0.27 UG/ML (FEU) (ref 0–0.5)
EKG IMPRESSION: NORMAL
EOSINOPHIL # BLD AUTO: 0.01 K/UL (ref 0–0.51)
EOSINOPHIL NFR BLD: 0.1 % (ref 0–6.9)
ERYTHROCYTE [DISTWIDTH] IN BLOOD BY AUTOMATED COUNT: 43.9 FL (ref 35.9–50)
GFR SERPLBLD CREATININE-BSD FMLA CKD-EPI: 92 ML/MIN/1.73 M 2
GLOBULIN SER CALC-MCNC: 2.6 G/DL (ref 1.9–3.5)
GLUCOSE SERPL-MCNC: 120 MG/DL (ref 65–99)
GLUCOSE UR STRIP.AUTO-MCNC: NEGATIVE MG/DL
HCT VFR BLD AUTO: 39.3 % (ref 37–47)
HGB BLD-MCNC: 13.7 G/DL (ref 12–16)
IMM GRANULOCYTES # BLD AUTO: 0.04 K/UL (ref 0–0.11)
IMM GRANULOCYTES NFR BLD AUTO: 0.6 % (ref 0–0.9)
KETONES UR STRIP.AUTO-MCNC: 15 MG/DL
LEUKOCYTE ESTERASE UR QL STRIP.AUTO: NEGATIVE
LIPASE SERPL-CCNC: 41 U/L (ref 7–58)
LYMPHOCYTES # BLD AUTO: 0.95 K/UL (ref 1–4.8)
LYMPHOCYTES NFR BLD: 13.2 % (ref 22–41)
MCH RBC QN AUTO: 34.1 PG (ref 27–33)
MCHC RBC AUTO-ENTMCNC: 34.9 G/DL (ref 33.6–35)
MCV RBC AUTO: 97.8 FL (ref 81.4–97.8)
MICRO URNS: ABNORMAL
MONOCYTES # BLD AUTO: 0.76 K/UL (ref 0–0.85)
MONOCYTES NFR BLD AUTO: 10.6 % (ref 0–13.4)
NEUTROPHILS # BLD AUTO: 5.42 K/UL (ref 2–7.15)
NEUTROPHILS NFR BLD: 75.4 % (ref 44–72)
NITRITE UR QL STRIP.AUTO: NEGATIVE
NRBC # BLD AUTO: 0 K/UL
NRBC BLD-RTO: 0 /100 WBC
NT-PROBNP SERPL IA-MCNC: 292 PG/ML (ref 0–125)
PH UR STRIP.AUTO: 5.5 [PH] (ref 5–8)
PLATELET # BLD AUTO: 241 K/UL (ref 164–446)
PMV BLD AUTO: 9.1 FL (ref 9–12.9)
POTASSIUM SERPL-SCNC: 3.7 MMOL/L (ref 3.6–5.5)
PROT SERPL-MCNC: 7.1 G/DL (ref 6–8.2)
PROT UR QL STRIP: NEGATIVE MG/DL
RBC # BLD AUTO: 4.02 M/UL (ref 4.2–5.4)
RBC UR QL AUTO: NEGATIVE
SODIUM SERPL-SCNC: 134 MMOL/L (ref 135–145)
SP GR UR STRIP.AUTO: >=1.03
TROPONIN T SERPL-MCNC: 6 NG/L (ref 6–19)
WBC # BLD AUTO: 7.2 K/UL (ref 4.8–10.8)

## 2023-04-10 PROCEDURE — 700102 HCHG RX REV CODE 250 W/ 637 OVERRIDE(OP): Performed by: EMERGENCY MEDICINE

## 2023-04-10 PROCEDURE — 83690 ASSAY OF LIPASE: CPT

## 2023-04-10 PROCEDURE — 81003 URINALYSIS AUTO W/O SCOPE: CPT

## 2023-04-10 PROCEDURE — 80053 COMPREHEN METABOLIC PANEL: CPT

## 2023-04-10 PROCEDURE — 94760 N-INVAS EAR/PLS OXIMETRY 1: CPT

## 2023-04-10 PROCEDURE — 83880 ASSAY OF NATRIURETIC PEPTIDE: CPT

## 2023-04-10 PROCEDURE — 85025 COMPLETE CBC W/AUTO DIFF WBC: CPT

## 2023-04-10 PROCEDURE — 93005 ELECTROCARDIOGRAM TRACING: CPT | Performed by: EMERGENCY MEDICINE

## 2023-04-10 PROCEDURE — 85379 FIBRIN DEGRADATION QUANT: CPT

## 2023-04-10 PROCEDURE — 36415 COLL VENOUS BLD VENIPUNCTURE: CPT

## 2023-04-10 PROCEDURE — A9270 NON-COVERED ITEM OR SERVICE: HCPCS | Performed by: EMERGENCY MEDICINE

## 2023-04-10 PROCEDURE — 71045 X-RAY EXAM CHEST 1 VIEW: CPT

## 2023-04-10 PROCEDURE — 99284 EMERGENCY DEPT VISIT MOD MDM: CPT

## 2023-04-10 PROCEDURE — 84484 ASSAY OF TROPONIN QUANT: CPT

## 2023-04-10 RX ORDER — BENZONATATE 100 MG/1
100 CAPSULE ORAL 3 TIMES DAILY PRN
Qty: 60 CAPSULE | Refills: 0 | Status: SHIPPED | OUTPATIENT
Start: 2023-04-10 | End: 2024-01-29

## 2023-04-10 RX ORDER — ACETAMINOPHEN 325 MG/1
650 TABLET ORAL ONCE
Status: COMPLETED | OUTPATIENT
Start: 2023-04-10 | End: 2023-04-10

## 2023-04-10 RX ADMIN — ACETAMINOPHEN 650 MG: 325 TABLET, FILM COATED ORAL at 16:40

## 2023-04-10 ASSESSMENT — FIBROSIS 4 INDEX: FIB4 SCORE: 0.86

## 2023-04-10 NOTE — ED PROVIDER NOTES
ED Provider Note    CHIEF COMPLAINT  Chief Complaint   Patient presents with    Cough     For over a month now    has not taken anything for the cough    Fever     Only at night  101 fever about 3 weeks ago   none since      Headache     From coughing     Other     Rib pain from coughing        EXTERNAL RECORDS REVIEWED  Inpatient Notes seen in October 2022 for chest pain and Outpatient Notes outpatient notes in July and August 2022 for bicep tendinitis    HPI/ROS  LIMITATION TO HISTORY   Select: : None  OUTSIDE HISTORIAN(S):  None    Tatyana Dallas is a 57 y.o. female who presents to the emergency department with 2 to 3 months of cough and muscle aches.  Past medical history as document below.  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  Since the time of her employment she states that she has been feeling relatively unwell.  Again chronic cough.  She notes that with the chronic coughing she just has diffuse muscle aches.  Roughly 3 weeks ago she did have a fever however she has not had a fever over the last few weeks.  Also intermittent headache with coughing fits.    Now that she is returning to [facility] she has insurance and said to come to the emergency department for further care and work-up.  She says that she has had outpatient COVID testing which has been negative to date.  She states that she is also recently stopped smoking but continues to work in a smoky environment.    Denies any leg pain or swelling.  No chest pain.  No abdominal pain.  No nausea, vomiting or diarrhea.    PAST MEDICAL HISTORY   has a past medical history of ETOH abuse (2/27/2013), HTN (hypertension) (2/27/2013), Hypertension, and Tobacco dependence (2/27/2013).    SURGICAL HISTORY   has a past surgical history that includes other orthopedic surgery (2001).    FAMILY HISTORY  Family History   Problem Relation Age of Onset    Hypertension Mother     Diabetes Mother     Diabetes Father     Hypertension Father   "   Heart Disease Father     Cancer Maternal Grandmother 50        breast, uterine    Stroke Neg Hx     Hyperlipidemia Neg Hx     Genetic Disorder Neg Hx     Lung Disease Neg Hx        SOCIAL HISTORY  Social History     Tobacco Use    Smoking status: Every Day     Packs/day: 0.10     Years: 30.00     Pack years: 3.00     Types: Cigarettes    Smokeless tobacco: Never   Substance and Sexual Activity    Alcohol use: Not Currently    Drug use: No    Sexual activity: Yes     Partners: Male       CURRENT MEDICATIONS  Home Medications       Reviewed by Arianna Kovacs (Pharmacy Tech) on 04/10/23 at 1535  Med List Status: Complete     Medication Last Dose Status   amLODIPine (NORVASC) 10 MG Tab > 2 days Active   ibuprofen (MOTRIN) 200 MG Tab 4/10/2023 Active                    ALLERGIES  Allergies   Allergen Reactions    Oxycodone Rash, Itching and Vomiting     Rash itching and vomiting        PHYSICAL EXAM  VITAL SIGNS: /78   Pulse (!) 111   Temp 36.7 °C (98.1 °F) (Temporal)   Resp 16   Ht 1.651 m (5' 5\")   Wt 58.8 kg (129 lb 10.1 oz)   LMP 07/29/2013   SpO2 94%   BMI 21.57 kg/m²      Pulse ox interpretation: I interpret this pulse ox as normal.  Constitutional: Alert in no apparent distress.  HENT: No signs of trauma, Bilateral external ears normal, Nose normal.   Eyes: Pupils are equal and reactive  Neck: Normal range of motion, No tenderness, Supple  Cardiovascular: Regular rate and rhythm, no murmurs.   Thorax & Lungs: Normal breath sounds, No respiratory distress, No wheezing, No chest tenderness.   Abdomen: Bowel sounds normal, Soft, No tenderness  Skin: Warm, Dry, No erythema, No rash.   Extremities: Intact distal pulses, No edema, No tenderness, No cyanosis,  Negative Arabella's sign.   Musculoskeletal: Good range of motion in all major joints. No tenderness to palpation or major deformities noted.   Neurologic: Alert , Normal motor function, Normal sensory function, No focal deficits noted. "   Psychiatric: Affect normal, Judgment normal, Mood normal.         DIAGNOSTIC STUDIES / PROCEDURES      LABS  Results for orders placed or performed during the hospital encounter of 04/10/23   CBC with Differential   Result Value Ref Range    WBC 7.2 4.8 - 10.8 K/uL    RBC 4.02 (L) 4.20 - 5.40 M/uL    Hemoglobin 13.7 12.0 - 16.0 g/dL    Hematocrit 39.3 37.0 - 47.0 %    MCV 97.8 81.4 - 97.8 fL    MCH 34.1 (H) 27.0 - 33.0 pg    MCHC 34.9 33.6 - 35.0 g/dL    RDW 43.9 35.9 - 50.0 fL    Platelet Count 241 164 - 446 K/uL    MPV 9.1 9.0 - 12.9 fL    Neutrophils-Polys 75.40 (H) 44.00 - 72.00 %    Lymphocytes 13.20 (L) 22.00 - 41.00 %    Monocytes 10.60 0.00 - 13.40 %    Eosinophils 0.10 0.00 - 6.90 %    Basophils 0.10 0.00 - 1.80 %    Immature Granulocytes 0.60 0.00 - 0.90 %    Nucleated RBC 0.00 /100 WBC    Neutrophils (Absolute) 5.42 2.00 - 7.15 K/uL    Lymphs (Absolute) 0.95 (L) 1.00 - 4.80 K/uL    Monos (Absolute) 0.76 0.00 - 0.85 K/uL    Eos (Absolute) 0.01 0.00 - 0.51 K/uL    Baso (Absolute) 0.01 0.00 - 0.12 K/uL    Immature Granulocytes (abs) 0.04 0.00 - 0.11 K/uL    NRBC (Absolute) 0.00 K/uL   Complete Metabolic Panel   Result Value Ref Range    Sodium 134 (L) 135 - 145 mmol/L    Potassium 3.7 3.6 - 5.5 mmol/L    Chloride 98 96 - 112 mmol/L    Co2 22 20 - 33 mmol/L    Anion Gap 14.0 7.0 - 16.0    Glucose 120 (H) 65 - 99 mg/dL    Bun 15 8 - 22 mg/dL    Creatinine 0.76 0.50 - 1.40 mg/dL    Calcium 9.8 8.4 - 10.2 mg/dL    AST(SGOT) 70 (H) 12 - 45 U/L    ALT(SGPT) 52 (H) 2 - 50 U/L    Alkaline Phosphatase 96 30 - 99 U/L    Total Bilirubin 0.3 0.1 - 1.5 mg/dL    Albumin 4.5 3.2 - 4.9 g/dL    Total Protein 7.1 6.0 - 8.2 g/dL    Globulin 2.6 1.9 - 3.5 g/dL    A-G Ratio 1.7 g/dL   Lipase   Result Value Ref Range    Lipase 41 7 - 58 U/L   Urinalysis    Specimen: Urine   Result Value Ref Range    Color Yellow     Character Clear     Specific Gravity >=1.030 <1.035    Ph 5.5 5.0 - 8.0    Glucose Negative Negative mg/dL     Ketones 15 (A) Negative mg/dL    Protein Negative Negative mg/dL    Bilirubin Small (A) Negative    Nitrite Negative Negative    Leukocyte Esterase Negative Negative    Occult Blood Negative Negative    Micro Urine Req see below    D-DIMER   Result Value Ref Range    D-Dimer <0.27 0.00 - 0.50 ug/mL (FEU)   proBrain Natriuretic Peptide, NT   Result Value Ref Range    NT-proBNP 292 (H) 0 - 125 pg/mL   TROPONIN   Result Value Ref Range    Troponin T 6 6 - 19 ng/L   CORRECTED CALCIUM   Result Value Ref Range    Correct Calcium 9.4 8.5 - 10.5 mg/dL   ESTIMATED GFR   Result Value Ref Range    GFR (CKD-EPI) 92 >60 mL/min/1.73 m 2   EKG   Result Value Ref Range    Report       [facility] Emergency Dept.    Test Date:  2023-04-10  Pt Name:    EDMNUD OSORIO                 Department[facility]  MRN:        [id number]                      [facility]  Gender:     Female                       Technician: [id number]  :        1965                   Requested By:CHRISTOPHE BROOKS  Order #:    [id number]                    Reading MD: Christophe Brooks    Measurements  Intervals                                Axis  Rate:       104                          P:          69  GA:         134                          QRS:        54  QRSD:       75                           T:          71  QT:         318  QTc:        419    Interpretive Statements  Sinus tachycardia  Baseline wander in lead(s) II,III,aVF  Compared to ECG 10/16/2022 09:36:47  Inferior Q waves no longer present  Q waves no longer present  ST (T wave) deviation no longer present  Electronically Signed On 4- 17:12:00 PDT by Christophe Brooks           RADIOLOGY  I have independently interpreted the diagnostic imaging associated with this visit and am waiting the final reading from the radiologist.   My preliminary interpretation is as follows: CXR NAD  Radiologist interpretation:   DX-CHEST-PORTABLE (1 VIEW)   Final Result         1. No acute  cardiopulmonary abnormalities are identified.            COURSE & MEDICAL DECISION MAKING    ED Observation Status? No; Patient does not meet criteria for ED Observation.     INITIAL ASSESSMENT, COURSE AND PLAN  Care Narrative: 57-year-old female presenting to the emergency department with the above presentation.  Symptoms have now been ongoing for months.  We will proceed with hematologic evaluation as well as radiographic imaging.  Low suspicion for emergent pathology     DISPOSITION AND DISCUSSIONS  I have discussed management of the patient with the following physicians and OSCAR's: None    Discussion of management with other QHP or appropriate source(s): None     Escalation of care considered, and ultimately not performed:acute inpatient care management, however at this time, the patient is most appropriate for outpatient management    Barriers to care at this time, including but not limited to:  None .     Decision tools and prescription drugs considered including, but not limited to: Antibiotics not indicated, D-dimer negative, and HEART Score low .    57-year-old female presenting to the emerged part with the above presentation.  Work-up today is largely benign.  Broad differential.  High suspicion for persistent cough post viral syndrome.  Troponin is negative.  D-dimer is negative.  White count negative.  Chest x-ray without acute changes.  Beyond cardiopulmonary and infectious etiologies additional GI considerations such as reflux have also been discussed with regards to chronic cough.  At this point the patient will resume outpatient care and follow-up with her PCP now that she has insurance.  She is also understanding return precautions.  She is understanding of an over-the-counter medication regimen as discussed at bedside.  Should she have any changes or worsening or even more prolonged symptomatology she is understanding return precautions here to the ER.    FINAL DIAGNOSIS  1. Subacute cough    2.  Transaminitis           Electronically signed by: Christophe Fox M.D., 4/10/2023 4:01 PM

## 2023-04-10 NOTE — ED TRIAGE NOTES
Pt comes in c/o cough for over a month now   clear sputum  not getting better  c/o H/A, body aches from  the coughing  rib pains   did have fevers while at work several days ago however does not having tenometer to take her temp at home   has not taken anything for her cough  just motrin

## 2023-11-26 ENCOUNTER — HOSPITAL ENCOUNTER (EMERGENCY)
Facility: MEDICAL CENTER | Age: 58
End: 2023-11-26
Attending: EMERGENCY MEDICINE
Payer: MEDICAID

## 2023-11-26 ENCOUNTER — APPOINTMENT (OUTPATIENT)
Dept: RADIOLOGY | Facility: MEDICAL CENTER | Age: 58
End: 2023-11-26
Attending: EMERGENCY MEDICINE
Payer: MEDICAID

## 2023-11-26 VITALS
HEART RATE: 86 BPM | SYSTOLIC BLOOD PRESSURE: 163 MMHG | RESPIRATION RATE: 16 BRPM | TEMPERATURE: 97 F | OXYGEN SATURATION: 98 % | WEIGHT: 140 LBS | DIASTOLIC BLOOD PRESSURE: 99 MMHG | BODY MASS INDEX: 23.3 KG/M2

## 2023-11-26 DIAGNOSIS — R55 NEAR SYNCOPE: ICD-10-CM

## 2023-11-26 DIAGNOSIS — I10 PRIMARY HYPERTENSION: ICD-10-CM

## 2023-11-26 LAB
ALBUMIN SERPL BCP-MCNC: 5 G/DL (ref 3.2–4.9)
ALBUMIN/GLOB SERPL: 1.9 G/DL
ALP SERPL-CCNC: 92 U/L (ref 30–99)
ALT SERPL-CCNC: 13 U/L (ref 2–50)
ANION GAP SERPL CALC-SCNC: 24 MMOL/L (ref 7–16)
APPEARANCE UR: ABNORMAL
AST SERPL-CCNC: 22 U/L (ref 12–45)
BACTERIA #/AREA URNS HPF: ABNORMAL /HPF
BASOPHILS # BLD AUTO: 0.1 % (ref 0–1.8)
BASOPHILS # BLD: 0.01 K/UL (ref 0–0.12)
BILIRUB SERPL-MCNC: 1.6 MG/DL (ref 0.1–1.5)
BILIRUB UR QL STRIP.AUTO: NEGATIVE
BUN SERPL-MCNC: 11 MG/DL (ref 8–22)
CALCIUM ALBUM COR SERPL-MCNC: 9 MG/DL (ref 8.5–10.5)
CALCIUM SERPL-MCNC: 9.8 MG/DL (ref 8.5–10.5)
CHLORIDE SERPL-SCNC: 92 MMOL/L (ref 96–112)
CO2 SERPL-SCNC: 21 MMOL/L (ref 20–33)
COLOR UR: ABNORMAL
CREAT SERPL-MCNC: 1.02 MG/DL (ref 0.5–1.4)
EKG IMPRESSION: NORMAL
EOSINOPHIL # BLD AUTO: 0 K/UL (ref 0–0.51)
EOSINOPHIL NFR BLD: 0 % (ref 0–6.9)
EPI CELLS #/AREA URNS HPF: ABNORMAL /HPF
ERYTHROCYTE [DISTWIDTH] IN BLOOD BY AUTOMATED COUNT: 44.9 FL (ref 35.9–50)
GFR SERPLBLD CREATININE-BSD FMLA CKD-EPI: 64 ML/MIN/1.73 M 2
GLOBULIN SER CALC-MCNC: 2.6 G/DL (ref 1.9–3.5)
GLUCOSE SERPL-MCNC: 181 MG/DL (ref 65–99)
GLUCOSE UR STRIP.AUTO-MCNC: NEGATIVE MG/DL
HCT VFR BLD AUTO: 40.9 % (ref 37–47)
HGB BLD-MCNC: 14.3 G/DL (ref 12–16)
HYALINE CASTS #/AREA URNS LPF: ABNORMAL /LPF
IMM GRANULOCYTES # BLD AUTO: 0.04 K/UL (ref 0–0.11)
IMM GRANULOCYTES NFR BLD AUTO: 0.5 % (ref 0–0.9)
KETONES UR STRIP.AUTO-MCNC: 80 MG/DL
LEUKOCYTE ESTERASE UR QL STRIP.AUTO: ABNORMAL
LIPASE SERPL-CCNC: 50 U/L (ref 11–82)
LYMPHOCYTES # BLD AUTO: 1.03 K/UL (ref 1–4.8)
LYMPHOCYTES NFR BLD: 12 % (ref 22–41)
MCH RBC QN AUTO: 34.4 PG (ref 27–33)
MCHC RBC AUTO-ENTMCNC: 35 G/DL (ref 32.2–35.5)
MCV RBC AUTO: 98.3 FL (ref 81.4–97.8)
MICRO URNS: ABNORMAL
MONOCYTES # BLD AUTO: 0.29 K/UL (ref 0–0.85)
MONOCYTES NFR BLD AUTO: 3.4 % (ref 0–13.4)
NEUTROPHILS # BLD AUTO: 7.21 K/UL (ref 1.82–7.42)
NEUTROPHILS NFR BLD: 84 % (ref 44–72)
NITRITE UR QL STRIP.AUTO: NEGATIVE
NRBC # BLD AUTO: 0 K/UL
NRBC BLD-RTO: 0 /100 WBC (ref 0–0.2)
PH UR STRIP.AUTO: 5 [PH] (ref 5–8)
PLATELET # BLD AUTO: 197 K/UL (ref 164–446)
PMV BLD AUTO: 9.3 FL (ref 9–12.9)
POTASSIUM SERPL-SCNC: 3.4 MMOL/L (ref 3.6–5.5)
PROT SERPL-MCNC: 7.6 G/DL (ref 6–8.2)
PROT UR QL STRIP: 100 MG/DL
RBC # BLD AUTO: 4.16 M/UL (ref 4.2–5.4)
RBC # URNS HPF: ABNORMAL /HPF
RBC UR QL AUTO: NEGATIVE
SODIUM SERPL-SCNC: 137 MMOL/L (ref 135–145)
SP GR UR STRIP.AUTO: 1.03
TSH SERPL DL<=0.005 MIU/L-ACNC: 3.94 UIU/ML (ref 0.38–5.33)
UROBILINOGEN UR STRIP.AUTO-MCNC: 1 MG/DL
WBC # BLD AUTO: 8.6 K/UL (ref 4.8–10.8)
WBC #/AREA URNS HPF: ABNORMAL /HPF

## 2023-11-26 PROCEDURE — 99284 EMERGENCY DEPT VISIT MOD MDM: CPT

## 2023-11-26 PROCEDURE — 85025 COMPLETE CBC W/AUTO DIFF WBC: CPT

## 2023-11-26 PROCEDURE — 36415 COLL VENOUS BLD VENIPUNCTURE: CPT

## 2023-11-26 PROCEDURE — 93005 ELECTROCARDIOGRAM TRACING: CPT | Performed by: EMERGENCY MEDICINE

## 2023-11-26 PROCEDURE — 700105 HCHG RX REV CODE 258: Mod: UD | Performed by: EMERGENCY MEDICINE

## 2023-11-26 PROCEDURE — 71045 X-RAY EXAM CHEST 1 VIEW: CPT

## 2023-11-26 PROCEDURE — 83690 ASSAY OF LIPASE: CPT

## 2023-11-26 PROCEDURE — 80053 COMPREHEN METABOLIC PANEL: CPT

## 2023-11-26 PROCEDURE — 81001 URINALYSIS AUTO W/SCOPE: CPT

## 2023-11-26 PROCEDURE — 84443 ASSAY THYROID STIM HORMONE: CPT

## 2023-11-26 PROCEDURE — 93005 ELECTROCARDIOGRAM TRACING: CPT

## 2023-11-26 RX ORDER — SODIUM CHLORIDE, SODIUM LACTATE, POTASSIUM CHLORIDE, CALCIUM CHLORIDE 600; 310; 30; 20 MG/100ML; MG/100ML; MG/100ML; MG/100ML
1000 INJECTION, SOLUTION INTRAVENOUS ONCE
Status: COMPLETED | OUTPATIENT
Start: 2023-11-26 | End: 2023-11-26

## 2023-11-26 RX ADMIN — SODIUM CHLORIDE, POTASSIUM CHLORIDE, SODIUM LACTATE AND CALCIUM CHLORIDE 1000 ML: 600; 310; 30; 20 INJECTION, SOLUTION INTRAVENOUS at 09:20

## 2023-11-26 ASSESSMENT — FIBROSIS 4 INDEX: FIB4 SCORE: 2.26

## 2023-11-26 ASSESSMENT — LIFESTYLE VARIABLES
TREMOR: *
ANXIETY: *
AUDITORY DISTURBANCES: NOT PRESENT
HEADACHE, FULLNESS IN HEAD: NOT PRESENT
AGITATION: NORMAL ACTIVITY
PAROXYSMAL SWEATS: NO SWEAT VISIBLE
TOTAL SCORE: 5
NAUSEA AND VOMITING: NO NAUSEA AND NO VOMITING
ORIENTATION AND CLOUDING OF SENSORIUM: ORIENTED AND CAN DO SERIAL ADDITIONS
VISUAL DISTURBANCES: NOT PRESENT

## 2023-11-26 NOTE — ED NOTES
PIV started. Bolus hung. Pt given warm blankets.   Safety reviewed. Call light within reach. Pt  denies additional needs.

## 2023-11-26 NOTE — ED NOTES
Reviewed discharge instructions, patient verbalized understanding. States they will schedule follow up appointment if needed.     Denies further questions at this time. Pt ambulatory out of ER with steady gait where she was taken home by her mother.

## 2023-11-26 NOTE — ED TRIAGE NOTES
"Pt to triage .  Chief Complaint   Patient presents with    Dizziness     Pt c/o sudden onset of dizziness while at work and had a near syncopal episode where \"my legs were giving out on me\"     Syncope    N/V    Pt states she did stop drinking alcohol approx 2 days ago and believes it may be related to this. Pt pale, diaphoretic in triage   "

## 2023-11-26 NOTE — ED NOTES
Urine collected and sent. Fluids placed on pressure bag to expedite process. Pt denies additional needs

## 2023-11-26 NOTE — ED PROVIDER NOTES
"ED Provider Note    CHIEF COMPLAINT  Chief Complaint   Patient presents with    Dizziness     Pt c/o sudden onset of dizziness while at work and had a near syncopal episode where \"my legs were giving out on me\"     Syncope    N/V       EXTERNAL RECORDS REVIEWED  Reviewed laboratory studies and workup for shortness of breath dated April 2023    HPI/ROS  LIMITATION TO HISTORY   None  OUTSIDE HISTORIAN(S):  None    Tatyana Dallas is a 58 y.o. female who presents for evaluation of near syncope.  The patient reports that she was working outside in the cold and episode of dizziness and lightheadedness and near syncope.  She specifically denies any chest pain.  No strokelike symptoms such as focal numbness weakness tingling to the arms legs or face.  She denies any pain or discomfort in the chest.  No shortness of breath or chest pain.  No pain radiating to the back no leg swelling or hemoptysis.  Of note the patient does have a history of longstanding alcohol abuse and just stopped drinking 2 days ago.  She specifically denies history of alcohol withdrawal seizures or delirium tremens.  No sensory deficits to the arms legs or face.  No abdominal pain.  He did not actually lose consciousness and transient dizziness subsided.    PAST MEDICAL HISTORY   has a past medical history of ETOH abuse (2/27/2013), HTN (hypertension) (2/27/2013), Hypertension, and Tobacco dependence (2/27/2013).    SURGICAL HISTORY   has a past surgical history that includes other orthopedic surgery (2001).    FAMILY HISTORY  Family History   Problem Relation Age of Onset    Hypertension Mother     Diabetes Mother     Diabetes Father     Hypertension Father     Heart Disease Father     Cancer Maternal Grandmother 50        breast, uterine    Stroke Neg Hx     Hyperlipidemia Neg Hx     Genetic Disorder Neg Hx     Lung Disease Neg Hx        SOCIAL HISTORY  Social History     Tobacco Use    Smoking status: Every Day     Current packs/day: 0.10     " Average packs/day: 0.1 packs/day for 30.0 years (3.0 ttl pk-yrs)     Types: Cigarettes    Smokeless tobacco: Never   Substance and Sexual Activity    Alcohol use: Not Currently    Drug use: No    Sexual activity: Yes     Partners: Male       CURRENT MEDICATIONS  Home Medications       Reviewed by Lianne Mckinley R.N. (Registered Nurse) on 11/26/23 at 0840  Med List Status: Partial     Medication Last Dose Status   amLODIPine (NORVASC) 10 MG Tab  Active   benzonatate (TESSALON) 100 MG Cap  Active   ibuprofen (MOTRIN) 200 MG Tab  Active                    ALLERGIES  Allergies   Allergen Reactions    Oxycodone Rash, Itching and Vomiting     Rash itching and vomiting        PHYSICAL EXAM  VITAL SIGNS: BP (!) 137/90   Pulse 93   Temp 35.8 °C (96.5 °F) (Temporal)   Resp 20   Wt 63.5 kg (140 lb)   LMP 07/29/2013   SpO2 98%   BMI 23.30 kg/m²    Pulse ox interpretation: I interpret this pulse ox as normal.  Constitutional: Alert and oriented x 3, no acute distress  HEENT: Atraumatic normocephalic, pupils are equal round reactive to light extraocular movements are intact. The nares is clear, external ears are normal, mouth shows moist mucous membranes normal dentition for age  Neck: Supple, no JVD no tracheal deviation  Cardiovascular: Regular rate and rhythm no murmur rub or gallop 2+ pulses peripherally x4  Thorax & Lungs: No respiratory distress, no wheezes rales or rhonchi, No chest tenderness.   GI: Soft nontender nondistended positive bowel sounds, no peritoneal signs  Skin: Warm dry no acute rash or lesion  Musculoskeletal: Moving all extremities with full range and 5 of 5 strength no acute  deformity no pedal edema negative Homans' sign  Neurologic: Cranial nerves III through XII are grossly intact no sensory deficit no cerebellar dysfunction gait is normal no pronator drift of the arms or legs.  NIH stroke scale score of 0  Psychiatric: Appropriate affect for situation at this time          DIAGNOSTIC STUDIES /  PROCEDURES    LABS  Results for orders placed or performed during the hospital encounter of 11/26/23   CBC with Differential   Result Value Ref Range    WBC 8.6 4.8 - 10.8 K/uL    RBC 4.16 (L) 4.20 - 5.40 M/uL    Hemoglobin 14.3 12.0 - 16.0 g/dL    Hematocrit 40.9 37.0 - 47.0 %    MCV 98.3 (H) 81.4 - 97.8 fL    MCH 34.4 (H) 27.0 - 33.0 pg    MCHC 35.0 32.2 - 35.5 g/dL    RDW 44.9 35.9 - 50.0 fL    Platelet Count 197 164 - 446 K/uL    MPV 9.3 9.0 - 12.9 fL    Neutrophils-Polys 84.00 (H) 44.00 - 72.00 %    Lymphocytes 12.00 (L) 22.00 - 41.00 %    Monocytes 3.40 0.00 - 13.40 %    Eosinophils 0.00 0.00 - 6.90 %    Basophils 0.10 0.00 - 1.80 %    Immature Granulocytes 0.50 0.00 - 0.90 %    Nucleated RBC 0.00 0.00 - 0.20 /100 WBC    Neutrophils (Absolute) 7.21 1.82 - 7.42 K/uL    Lymphs (Absolute) 1.03 1.00 - 4.80 K/uL    Monos (Absolute) 0.29 0.00 - 0.85 K/uL    Eos (Absolute) 0.00 0.00 - 0.51 K/uL    Baso (Absolute) 0.01 0.00 - 0.12 K/uL    Immature Granulocytes (abs) 0.04 0.00 - 0.11 K/uL    NRBC (Absolute) 0.00 K/uL   Complete Metabolic Panel   Result Value Ref Range    Sodium 137 135 - 145 mmol/L    Potassium 3.4 (L) 3.6 - 5.5 mmol/L    Chloride 92 (L) 96 - 112 mmol/L    Co2 21 20 - 33 mmol/L    Anion Gap 24.0 (H) 7.0 - 16.0    Glucose 181 (H) 65 - 99 mg/dL    Bun 11 8 - 22 mg/dL    Creatinine 1.02 0.50 - 1.40 mg/dL    Calcium 9.8 8.5 - 10.5 mg/dL    Correct Calcium 9.0 8.5 - 10.5 mg/dL    AST(SGOT) 22 12 - 45 U/L    ALT(SGPT) 13 2 - 50 U/L    Alkaline Phosphatase 92 30 - 99 U/L    Total Bilirubin 1.6 (H) 0.1 - 1.5 mg/dL    Albumin 5.0 (H) 3.2 - 4.9 g/dL    Total Protein 7.6 6.0 - 8.2 g/dL    Globulin 2.6 1.9 - 3.5 g/dL    A-G Ratio 1.9 g/dL   Lipase   Result Value Ref Range    Lipase 50 11 - 82 U/L   Urinalysis    Specimen: Urine   Result Value Ref Range    Micro Urine Req Microscopic    TSH WITH REFLEX TO FT4   Result Value Ref Range    TSH 3.940 0.380 - 5.330 uIU/mL   ESTIMATED GFR   Result Value Ref Range    GFR  (CKD-EPI) 64 >60 mL/min/1.73 m 2   EKG   Result Value Ref Range    Report       [facility] Emergency Dept.    Test Date:  2023  Pt Name:    EDMUND OSORIO                 Department: ER  MRN:        [id number]                      Room:  Gender:     Female                       Technician: [id number]  :        1965                   Requested By:ER TRIAGE PROTOCOL  Order #:    [id number]                    Reading MD:    Measurements  Intervals                                Axis  Rate:       88                           P:          70  DE:         135                          QRS:        39  QRSD:       110                          T:          62  QT:         383  QTc:        464    Interpretive Statements  Sinus rhythm  Abnormal inferior Q waves  Compared to ECG 04/10/2023 16:04:34  Inferior Q waves now present  Q waves now present  Sinus tachycardia no longer present        EKG twelve-lead interpretation by me rate 88 sinus rhythm Q waves noted without STEMI morphology.  No acute ST segment elevation or depression no pathological T wave inversions    RADIOLOGY  I have independently interpreted the diagnostic imaging associated with this visit and am waiting the final reading from the radiologist.   My preliminary interpretation is as follows: No evidence of heart failure or pneumonitis or airspace disease process  Radiologist interpretation:   DX-CHEST-PORTABLE (1 VIEW)   Final Result      No acute cardiac or pulmonary abnormalities are identified.          COURSE & MEDICAL DECISION MAKING    ED Observation Status? No; Patient does not meet criteria for ED Observation.     INITIAL ASSESSMENT, COURSE AND PLAN  Care Narrative:     This is a very pleasant 58-year-old female that episode that was quite transient of dizziness and near fainting.  She has no symptoms on arrival such as chest pain strokelike symptoms fevers chills.  Vital signs other than mild to moderate hypertension were  reassuring.  Specifically there is no tachycardia hypoxia or hypotension or fever.  Patient admits that she discontinued alcohol 2 days ago.  I suspect she is going through some mild withdrawal symptoms but does not have any evidence of profound tachycardia delirium tremens etc.  Clinically she appears well.  Her laboratory studies are notable for some mild what is likely alcohol ketoacidosis.  Blood sugar slightly elevated.  Blood pressure is moderately elevated but apparently the patient already has a prescription waiting to be picked up tomorrow.  Her thyroid studies and CBC metabolic panel are otherwise normal.  I counseled her to return as needed for new or worsening symptoms      ADDITIONAL PROBLEM LIST    DISPOSITION AND DISCUSSIONS  I have discussed management of the patient with the following physicians and OSCAR's: None    Discussion of management with other Q or appropriate source(s): None    Escalation of care considered, and ultimately not performed:acute inpatient care management, however at this time, the patient is most appropriate for outpatient management    Barriers to care at this time, including but not limited to: None.     Decision tools and prescription drugs considered including, but not limited to: None    FINAL DIAGNOSIS  Near syncope  Mild alcohol withdrawal  Hypertension       Electronically signed by: Christophe Nathan M.D., 11/26/2023 9:59 AM

## 2023-11-26 NOTE — ED NOTES
Pt states was 1/2 pint daily drinker, stopped drinking 3 days ago as she thought it was time.   Slight tremors noted.

## 2024-01-29 ENCOUNTER — APPOINTMENT (OUTPATIENT)
Dept: RADIOLOGY | Facility: MEDICAL CENTER | Age: 59
End: 2024-01-29
Attending: EMERGENCY MEDICINE
Payer: MEDICAID

## 2024-01-29 ENCOUNTER — HOSPITAL ENCOUNTER (OUTPATIENT)
Facility: MEDICAL CENTER | Age: 59
End: 2024-01-30
Attending: EMERGENCY MEDICINE | Admitting: HOSPITALIST
Payer: MEDICAID

## 2024-01-29 DIAGNOSIS — E86.0 DEHYDRATION: ICD-10-CM

## 2024-01-29 DIAGNOSIS — R68.89 FLU-LIKE SYMPTOMS: ICD-10-CM

## 2024-01-29 DIAGNOSIS — R07.9 CHEST PAIN, UNSPECIFIED TYPE: ICD-10-CM

## 2024-01-29 DIAGNOSIS — E87.6 HYPOKALEMIA: ICD-10-CM

## 2024-01-29 PROBLEM — B34.9 VIRAL SYNDROME: Status: ACTIVE | Noted: 2024-01-29

## 2024-01-29 LAB
ALBUMIN SERPL BCP-MCNC: 4.8 G/DL (ref 3.2–4.9)
ALBUMIN/GLOB SERPL: 1.8 G/DL
ALP SERPL-CCNC: 95 U/L (ref 30–99)
ALT SERPL-CCNC: 10 U/L (ref 2–50)
ANION GAP SERPL CALC-SCNC: 27 MMOL/L (ref 7–16)
AST SERPL-CCNC: 21 U/L (ref 12–45)
B PARAP IS1001 DNA NPH QL NAA+NON-PROBE: NOT DETECTED
B PERT.PT PRMT NPH QL NAA+NON-PROBE: NOT DETECTED
BASOPHILS # BLD AUTO: 0.2 % (ref 0–1.8)
BASOPHILS # BLD: 0.02 K/UL (ref 0–0.12)
BILIRUB SERPL-MCNC: 0.8 MG/DL (ref 0.1–1.5)
BUN SERPL-MCNC: 13 MG/DL (ref 8–22)
C PNEUM DNA NPH QL NAA+NON-PROBE: NOT DETECTED
CALCIUM ALBUM COR SERPL-MCNC: 9.7 MG/DL (ref 8.5–10.5)
CALCIUM SERPL-MCNC: 10.3 MG/DL (ref 8.4–10.2)
CHLORIDE SERPL-SCNC: 92 MMOL/L (ref 96–112)
CO2 SERPL-SCNC: 15 MMOL/L (ref 20–33)
CREAT SERPL-MCNC: 1.27 MG/DL (ref 0.5–1.4)
D DIMER PPP IA.FEU-MCNC: <0.27 UG/ML (FEU) (ref 0–0.5)
EKG IMPRESSION: NORMAL
EKG IMPRESSION: NORMAL
EOSINOPHIL # BLD AUTO: 0.05 K/UL (ref 0–0.51)
EOSINOPHIL NFR BLD: 0.5 % (ref 0–6.9)
ERYTHROCYTE [DISTWIDTH] IN BLOOD BY AUTOMATED COUNT: 43.8 FL (ref 35.9–50)
FLUAV RNA NPH QL NAA+NON-PROBE: NOT DETECTED
FLUAV RNA SPEC QL NAA+PROBE: NEGATIVE
FLUBV RNA NPH QL NAA+NON-PROBE: NOT DETECTED
FLUBV RNA SPEC QL NAA+PROBE: NEGATIVE
GFR SERPLBLD CREATININE-BSD FMLA CKD-EPI: 49 ML/MIN/1.73 M 2
GLOBULIN SER CALC-MCNC: 2.6 G/DL (ref 1.9–3.5)
GLUCOSE SERPL-MCNC: 142 MG/DL (ref 65–99)
HADV DNA NPH QL NAA+NON-PROBE: NOT DETECTED
HCOV 229E RNA NPH QL NAA+NON-PROBE: NOT DETECTED
HCOV HKU1 RNA NPH QL NAA+NON-PROBE: NOT DETECTED
HCOV NL63 RNA NPH QL NAA+NON-PROBE: NOT DETECTED
HCOV OC43 RNA NPH QL NAA+NON-PROBE: NOT DETECTED
HCT VFR BLD AUTO: 37.9 % (ref 37–47)
HGB BLD-MCNC: 13.5 G/DL (ref 12–16)
HMPV RNA NPH QL NAA+NON-PROBE: NOT DETECTED
HPIV1 RNA NPH QL NAA+NON-PROBE: NOT DETECTED
HPIV2 RNA NPH QL NAA+NON-PROBE: NOT DETECTED
HPIV3 RNA NPH QL NAA+NON-PROBE: NOT DETECTED
HPIV4 RNA NPH QL NAA+NON-PROBE: NOT DETECTED
IMM GRANULOCYTES # BLD AUTO: 0.03 K/UL (ref 0–0.11)
IMM GRANULOCYTES NFR BLD AUTO: 0.3 % (ref 0–0.9)
LYMPHOCYTES # BLD AUTO: 2.88 K/UL (ref 1–4.8)
LYMPHOCYTES NFR BLD: 26.6 % (ref 22–41)
M PNEUMO DNA NPH QL NAA+NON-PROBE: NOT DETECTED
MAGNESIUM SERPL-MCNC: 1.7 MG/DL (ref 1.5–2.5)
MCH RBC QN AUTO: 34.9 PG (ref 27–33)
MCHC RBC AUTO-ENTMCNC: 35.6 G/DL (ref 32.2–35.5)
MCV RBC AUTO: 97.9 FL (ref 81.4–97.8)
MONOCYTES # BLD AUTO: 0.46 K/UL (ref 0–0.85)
MONOCYTES NFR BLD AUTO: 4.2 % (ref 0–13.4)
NEUTROPHILS # BLD AUTO: 7.4 K/UL (ref 1.82–7.42)
NEUTROPHILS NFR BLD: 68.2 % (ref 44–72)
NRBC # BLD AUTO: 0 K/UL
NRBC BLD-RTO: 0 /100 WBC (ref 0–0.2)
PLATELET # BLD AUTO: 284 K/UL (ref 164–446)
PMV BLD AUTO: 9.4 FL (ref 9–12.9)
POTASSIUM SERPL-SCNC: 2.7 MMOL/L (ref 3.6–5.5)
PROCALCITONIN SERPL-MCNC: 0.12 NG/ML
PROT SERPL-MCNC: 7.4 G/DL (ref 6–8.2)
RBC # BLD AUTO: 3.87 M/UL (ref 4.2–5.4)
RSV RNA NPH QL NAA+NON-PROBE: NOT DETECTED
RSV RNA SPEC QL NAA+PROBE: NEGATIVE
RV+EV RNA NPH QL NAA+NON-PROBE: NOT DETECTED
SARS-COV-2 RNA NPH QL NAA+NON-PROBE: NOTDETECTED
SARS-COV-2 RNA RESP QL NAA+PROBE: NOTDETECTED
SODIUM SERPL-SCNC: 134 MMOL/L (ref 135–145)
SPECIMEN SOURCE: NORMAL
TROPONIN T SERPL-MCNC: 8 NG/L (ref 6–19)
TROPONIN T SERPL-MCNC: <6 NG/L (ref 6–19)
TROPONIN T SERPL-MCNC: <6 NG/L (ref 6–19)
WBC # BLD AUTO: 10.8 K/UL (ref 4.8–10.8)

## 2024-01-29 PROCEDURE — 87581 M.PNEUMON DNA AMP PROBE: CPT

## 2024-01-29 PROCEDURE — 36415 COLL VENOUS BLD VENIPUNCTURE: CPT

## 2024-01-29 PROCEDURE — 71045 X-RAY EXAM CHEST 1 VIEW: CPT

## 2024-01-29 PROCEDURE — 700105 HCHG RX REV CODE 258: Performed by: HOSPITALIST

## 2024-01-29 PROCEDURE — 87633 RESP VIRUS 12-25 TARGETS: CPT | Mod: XU

## 2024-01-29 PROCEDURE — 700102 HCHG RX REV CODE 250 W/ 637 OVERRIDE(OP): Mod: JZ | Performed by: EMERGENCY MEDICINE

## 2024-01-29 PROCEDURE — 700102 HCHG RX REV CODE 250 W/ 637 OVERRIDE(OP): Performed by: HOSPITALIST

## 2024-01-29 PROCEDURE — 94760 N-INVAS EAR/PLS OXIMETRY 1: CPT

## 2024-01-29 PROCEDURE — A9270 NON-COVERED ITEM OR SERVICE: HCPCS | Mod: JZ | Performed by: EMERGENCY MEDICINE

## 2024-01-29 PROCEDURE — 93005 ELECTROCARDIOGRAM TRACING: CPT | Performed by: HOSPITALIST

## 2024-01-29 PROCEDURE — 0241U HCHG SARS-COV-2 COVID-19 NFCT DS RESP RNA 4 TRGT MIC: CPT

## 2024-01-29 PROCEDURE — 99407 BEHAV CHNG SMOKING > 10 MIN: CPT | Performed by: HOSPITALIST

## 2024-01-29 PROCEDURE — 700101 HCHG RX REV CODE 250: Performed by: EMERGENCY MEDICINE

## 2024-01-29 PROCEDURE — 84145 PROCALCITONIN (PCT): CPT

## 2024-01-29 PROCEDURE — 83735 ASSAY OF MAGNESIUM: CPT

## 2024-01-29 PROCEDURE — G0378 HOSPITAL OBSERVATION PER HR: HCPCS

## 2024-01-29 PROCEDURE — 93010 ELECTROCARDIOGRAM REPORT: CPT | Performed by: INTERNAL MEDICINE

## 2024-01-29 PROCEDURE — 96375 TX/PRO/DX INJ NEW DRUG ADDON: CPT

## 2024-01-29 PROCEDURE — 87486 CHLMYD PNEUM DNA AMP PROBE: CPT

## 2024-01-29 PROCEDURE — 99285 EMERGENCY DEPT VISIT HI MDM: CPT

## 2024-01-29 PROCEDURE — 93005 ELECTROCARDIOGRAM TRACING: CPT

## 2024-01-29 PROCEDURE — 96365 THER/PROPH/DIAG IV INF INIT: CPT

## 2024-01-29 PROCEDURE — 82962 GLUCOSE BLOOD TEST: CPT

## 2024-01-29 PROCEDURE — A9270 NON-COVERED ITEM OR SERVICE: HCPCS | Performed by: HOSPITALIST

## 2024-01-29 PROCEDURE — 87798 DETECT AGENT NOS DNA AMP: CPT

## 2024-01-29 PROCEDURE — 85025 COMPLETE CBC W/AUTO DIFF WBC: CPT

## 2024-01-29 PROCEDURE — 99223 1ST HOSP IP/OBS HIGH 75: CPT | Mod: 25 | Performed by: HOSPITALIST

## 2024-01-29 PROCEDURE — 85379 FIBRIN DEGRADATION QUANT: CPT

## 2024-01-29 PROCEDURE — 700111 HCHG RX REV CODE 636 W/ 250 OVERRIDE (IP): Mod: JZ | Performed by: HOSPITALIST

## 2024-01-29 PROCEDURE — 700105 HCHG RX REV CODE 258: Performed by: EMERGENCY MEDICINE

## 2024-01-29 PROCEDURE — 80053 COMPREHEN METABOLIC PANEL: CPT

## 2024-01-29 PROCEDURE — 93005 ELECTROCARDIOGRAM TRACING: CPT | Performed by: EMERGENCY MEDICINE

## 2024-01-29 PROCEDURE — 84484 ASSAY OF TROPONIN QUANT: CPT

## 2024-01-29 RX ORDER — BISACODYL 10 MG
10 SUPPOSITORY, RECTAL RECTAL
Status: DISCONTINUED | OUTPATIENT
Start: 2024-01-29 | End: 2024-01-30 | Stop reason: HOSPADM

## 2024-01-29 RX ORDER — CHOLECALCIFEROL (VITAMIN D3) 125 MCG
5 CAPSULE ORAL NIGHTLY
Status: DISCONTINUED | OUTPATIENT
Start: 2024-01-29 | End: 2024-01-30 | Stop reason: HOSPADM

## 2024-01-29 RX ORDER — POLYETHYLENE GLYCOL 3350 17 G/17G
1 POWDER, FOR SOLUTION ORAL
Status: DISCONTINUED | OUTPATIENT
Start: 2024-01-29 | End: 2024-01-30 | Stop reason: HOSPADM

## 2024-01-29 RX ORDER — SODIUM CHLORIDE 9 MG/ML
INJECTION, SOLUTION INTRAVENOUS CONTINUOUS
Status: DISCONTINUED | OUTPATIENT
Start: 2024-01-29 | End: 2024-01-30 | Stop reason: HOSPADM

## 2024-01-29 RX ORDER — PROMETHAZINE HYDROCHLORIDE 25 MG/1
12.5-25 SUPPOSITORY RECTAL EVERY 4 HOURS PRN
Status: DISCONTINUED | OUTPATIENT
Start: 2024-01-29 | End: 2024-01-30 | Stop reason: HOSPADM

## 2024-01-29 RX ORDER — ASPIRIN 300 MG/1
300 SUPPOSITORY RECTAL DAILY
Status: DISCONTINUED | OUTPATIENT
Start: 2024-01-29 | End: 2024-01-30 | Stop reason: HOSPADM

## 2024-01-29 RX ORDER — NITROGLYCERIN 0.4 MG/1
0.4 TABLET SUBLINGUAL
Status: DISCONTINUED | OUTPATIENT
Start: 2024-01-29 | End: 2024-01-30 | Stop reason: HOSPADM

## 2024-01-29 RX ORDER — ONDANSETRON 4 MG/1
4 TABLET, ORALLY DISINTEGRATING ORAL EVERY 4 HOURS PRN
Status: DISCONTINUED | OUTPATIENT
Start: 2024-01-29 | End: 2024-01-30 | Stop reason: HOSPADM

## 2024-01-29 RX ORDER — POTASSIUM CHLORIDE 20 MEQ/1
40 TABLET, EXTENDED RELEASE ORAL ONCE
Status: COMPLETED | OUTPATIENT
Start: 2024-01-29 | End: 2024-01-29

## 2024-01-29 RX ORDER — AMLODIPINE BESYLATE 5 MG/1
10 TABLET ORAL DAILY
Status: DISCONTINUED | OUTPATIENT
Start: 2024-01-30 | End: 2024-01-30 | Stop reason: HOSPADM

## 2024-01-29 RX ORDER — AMOXICILLIN 250 MG
2 CAPSULE ORAL 2 TIMES DAILY
Status: DISCONTINUED | OUTPATIENT
Start: 2024-01-29 | End: 2024-01-30 | Stop reason: HOSPADM

## 2024-01-29 RX ORDER — SODIUM CHLORIDE AND POTASSIUM CHLORIDE 300; 900 MG/100ML; MG/100ML
INJECTION, SOLUTION INTRAVENOUS CONTINUOUS
Status: DISPENSED | OUTPATIENT
Start: 2024-01-29 | End: 2024-01-29

## 2024-01-29 RX ORDER — MORPHINE SULFATE 4 MG/ML
1 INJECTION INTRAVENOUS
Status: DISCONTINUED | OUTPATIENT
Start: 2024-01-29 | End: 2024-01-30 | Stop reason: HOSPADM

## 2024-01-29 RX ORDER — HYDROCODONE BITARTRATE AND ACETAMINOPHEN 10; 325 MG/1; MG/1
1 TABLET ORAL EVERY 6 HOURS PRN
Status: DISCONTINUED | OUTPATIENT
Start: 2024-01-29 | End: 2024-01-30 | Stop reason: HOSPADM

## 2024-01-29 RX ORDER — LABETALOL HYDROCHLORIDE 5 MG/ML
10 INJECTION, SOLUTION INTRAVENOUS EVERY 4 HOURS PRN
Status: DISCONTINUED | OUTPATIENT
Start: 2024-01-29 | End: 2024-01-30 | Stop reason: HOSPADM

## 2024-01-29 RX ORDER — ACETAMINOPHEN 325 MG/1
650 TABLET ORAL EVERY 6 HOURS PRN
Status: DISCONTINUED | OUTPATIENT
Start: 2024-01-29 | End: 2024-01-30 | Stop reason: HOSPADM

## 2024-01-29 RX ORDER — ENOXAPARIN SODIUM 100 MG/ML
40 INJECTION SUBCUTANEOUS DAILY
Status: DISCONTINUED | OUTPATIENT
Start: 2024-01-29 | End: 2024-01-30 | Stop reason: HOSPADM

## 2024-01-29 RX ORDER — ACETAMINOPHEN 325 MG/1
650 TABLET ORAL ONCE
Status: COMPLETED | OUTPATIENT
Start: 2024-01-29 | End: 2024-01-29

## 2024-01-29 RX ORDER — ASPIRIN 325 MG
325 TABLET ORAL DAILY
Status: DISCONTINUED | OUTPATIENT
Start: 2024-01-29 | End: 2024-01-30 | Stop reason: HOSPADM

## 2024-01-29 RX ORDER — MORPHINE SULFATE 4 MG/ML
2 INJECTION INTRAVENOUS
Status: DISCONTINUED | OUTPATIENT
Start: 2024-01-29 | End: 2024-01-30 | Stop reason: HOSPADM

## 2024-01-29 RX ORDER — PROMETHAZINE HYDROCHLORIDE 25 MG/1
12.5-25 TABLET ORAL EVERY 4 HOURS PRN
Status: DISCONTINUED | OUTPATIENT
Start: 2024-01-29 | End: 2024-01-30 | Stop reason: HOSPADM

## 2024-01-29 RX ORDER — PROCHLORPERAZINE EDISYLATE 5 MG/ML
5-10 INJECTION INTRAMUSCULAR; INTRAVENOUS EVERY 4 HOURS PRN
Status: DISCONTINUED | OUTPATIENT
Start: 2024-01-29 | End: 2024-01-30 | Stop reason: HOSPADM

## 2024-01-29 RX ORDER — OMEPRAZOLE 20 MG/1
20 CAPSULE, DELAYED RELEASE ORAL DAILY
Status: DISCONTINUED | OUTPATIENT
Start: 2024-01-30 | End: 2024-01-30 | Stop reason: HOSPADM

## 2024-01-29 RX ORDER — SODIUM CHLORIDE 9 MG/ML
2000 INJECTION, SOLUTION INTRAVENOUS ONCE
Status: COMPLETED | OUTPATIENT
Start: 2024-01-29 | End: 2024-01-29

## 2024-01-29 RX ORDER — CALCIUM CARBONATE 500 MG/1
1000 TABLET, CHEWABLE ORAL 3 TIMES DAILY
Status: DISCONTINUED | OUTPATIENT
Start: 2024-01-29 | End: 2024-01-30 | Stop reason: HOSPADM

## 2024-01-29 RX ORDER — ASPIRIN 325 MG
650 TABLET ORAL EVERY 6 HOURS PRN
COMMUNITY

## 2024-01-29 RX ORDER — GUAIFENESIN/DEXTROMETHORPHAN 100-10MG/5
10 SYRUP ORAL EVERY 6 HOURS PRN
Status: DISCONTINUED | OUTPATIENT
Start: 2024-01-29 | End: 2024-01-30 | Stop reason: HOSPADM

## 2024-01-29 RX ORDER — ASPIRIN 81 MG/1
324 TABLET, CHEWABLE ORAL DAILY
Status: DISCONTINUED | OUTPATIENT
Start: 2024-01-29 | End: 2024-01-30 | Stop reason: HOSPADM

## 2024-01-29 RX ORDER — ONDANSETRON 2 MG/ML
4 INJECTION INTRAMUSCULAR; INTRAVENOUS EVERY 4 HOURS PRN
Status: DISCONTINUED | OUTPATIENT
Start: 2024-01-29 | End: 2024-01-30 | Stop reason: HOSPADM

## 2024-01-29 RX ADMIN — POTASSIUM CHLORIDE AND SODIUM CHLORIDE: 900; 300 INJECTION, SOLUTION INTRAVENOUS at 16:17

## 2024-01-29 RX ADMIN — POTASSIUM CHLORIDE 40 MEQ: 1500 TABLET, EXTENDED RELEASE ORAL at 16:16

## 2024-01-29 RX ADMIN — ACETAMINOPHEN 650 MG: 325 TABLET ORAL at 15:05

## 2024-01-29 RX ADMIN — MORPHINE SULFATE 1 MG: 4 INJECTION, SOLUTION INTRAMUSCULAR; INTRAVENOUS at 22:28

## 2024-01-29 RX ADMIN — SODIUM CHLORIDE: 9 INJECTION, SOLUTION INTRAVENOUS at 20:57

## 2024-01-29 RX ADMIN — ASPIRIN 325 MG: 325 TABLET ORAL at 17:57

## 2024-01-29 RX ADMIN — CALCIUM CARBONATE (ANTACID) CHEW TAB 500 MG 1000 MG: 500 CHEW TAB at 17:56

## 2024-01-29 RX ADMIN — Medication 5 MG: at 22:16

## 2024-01-29 RX ADMIN — SODIUM CHLORIDE 2000 ML: 9 INJECTION, SOLUTION INTRAVENOUS at 15:06

## 2024-01-29 ASSESSMENT — LIFESTYLE VARIABLES
SUBSTANCE_ABUSE: 1
ON A TYPICAL DAY WHEN YOU DRINK ALCOHOL HOW MANY DRINKS DO YOU HAVE: 0
DO YOU DRINK ALCOHOL: NO
AVERAGE NUMBER OF DAYS PER WEEK YOU HAVE A DRINK CONTAINING ALCOHOL: 0
HAVE PEOPLE ANNOYED YOU BY CRITICIZING YOUR DRINKING: NO
EVER HAD A DRINK FIRST THING IN THE MORNING TO STEADY YOUR NERVES TO GET RID OF A HANGOVER: NO
HAVE YOU EVER FELT YOU SHOULD CUT DOWN ON YOUR DRINKING: NO
ALCOHOL_USE: NO
HOW MANY TIMES IN THE PAST YEAR HAVE YOU HAD 5 OR MORE DRINKS IN A DAY: 0
TOTAL SCORE: 0
TOTAL SCORE: 0
EVER FELT BAD OR GUILTY ABOUT YOUR DRINKING: NO
TOTAL SCORE: 0
CONSUMPTION TOTAL: NEGATIVE

## 2024-01-29 ASSESSMENT — ENCOUNTER SYMPTOMS
SHORTNESS OF BREATH: 1
WHEEZING: 0
SEIZURES: 0
CONSTIPATION: 0
DIARRHEA: 0
SORE THROAT: 0
WEAKNESS: 1
DIAPHORESIS: 1
HEARTBURN: 1
DEPRESSION: 0
ABDOMINAL PAIN: 0
BLOOD IN STOOL: 0
HEADACHES: 1
VOMITING: 1
FEVER: 1
EYES NEGATIVE: 1
LOSS OF CONSCIOUSNESS: 0
HEMOPTYSIS: 0
COUGH: 0
MYALGIAS: 1
PALPITATIONS: 0
FOCAL WEAKNESS: 0
BRUISES/BLEEDS EASILY: 0
CHILLS: 1
NAUSEA: 1
BACK PAIN: 1
DIZZINESS: 1
DOUBLE VISION: 0
NERVOUS/ANXIOUS: 0

## 2024-01-29 ASSESSMENT — PAIN DESCRIPTION - PAIN TYPE
TYPE: ACUTE PAIN

## 2024-01-29 ASSESSMENT — COGNITIVE AND FUNCTIONAL STATUS - GENERAL
MOBILITY SCORE: 22
SUGGESTED CMS G CODE MODIFIER MOBILITY: CJ
CLIMB 3 TO 5 STEPS WITH RAILING: A LITTLE
SUGGESTED CMS G CODE MODIFIER DAILY ACTIVITY: CH
DAILY ACTIVITIY SCORE: 24
WALKING IN HOSPITAL ROOM: A LITTLE

## 2024-01-29 ASSESSMENT — FIBROSIS 4 INDEX
FIB4 SCORE: 1.31
FIB4 SCORE: 1.73

## 2024-01-29 ASSESSMENT — PATIENT HEALTH QUESTIONNAIRE - PHQ9
SUM OF ALL RESPONSES TO PHQ9 QUESTIONS 1 AND 2: 0
1. LITTLE INTEREST OR PLEASURE IN DOING THINGS: NOT AT ALL
2. FEELING DOWN, DEPRESSED, IRRITABLE, OR HOPELESS: NOT AT ALL

## 2024-01-29 NOTE — ED TRIAGE NOTES
"Chief Complaint   Patient presents with    Chest Pain     Left anterior chest pain \"point under left breast\" states pain started 1 hour ago while taking a walk \"7/10\"  and \"I can't take a deep breath. The pain goes away but comes back when I start walking.\"    Flu Like Symptoms     X7 days \"vomiting, chills, cough\" denies any sick contacts    Abdominal Pain     Left side abd pain onset 1 hour ago at same time as chest pain, +nausea vomiting, last emesis this morning.      BP 98/74   Pulse (!) 125   Temp 36.2 °C (97.2 °F) (Temporal)   Resp 18   Ht 1.651 m (5' 5\")   Wt 59.8 kg (131 lb 13.4 oz)   LMP 07/29/2013   SpO2 97%   BMI 21.94 kg/m²     "

## 2024-01-29 NOTE — ED NOTES
Benji from Lab called with critical result of K+ at 2.7. Critical lab result read back to Benji.   Dr. Taylor notified of critical lab result at 3771.  Critical lab result read back by Dr. Taylor.

## 2024-01-29 NOTE — ED NOTES
PIV placed, labs collected, swab collected  Pt and mother updated on plan of care.   Mother left bedside    Pt resting on gurney, pt in no acute distress, pt provided call light, instructed to call if needing any assistance, instructed not to get up by self, gurney in lowest position.

## 2024-01-29 NOTE — ED PROVIDER NOTES
"  ER Provider Note    Scribed for Erik Taylor M.D. by Lisa Mahmood. 1/29/2024   2:44 PM    Primary Care Provider: Rashmi Sigala M.D.    CHIEF COMPLAINT  Chief Complaint   Patient presents with    Chest Pain     Left anterior chest pain \"point under left breast\" states pain started 1 hour ago while taking a walk \"7/10\"  and \"I can't take a deep breath. The pain goes away but comes back when I start walking.\"    Flu Like Symptoms     X7 days \"vomiting, chills, cough\" denies any sick contacts    Abdominal Pain     Left side abd pain onset 1 hour ago at same time as chest pain, +nausea vomiting, last emesis this morning.      EXTERNAL RECORDS REVIEWED  Outpatient Notes: She was seen in November 2023 for dizziness and syncope.    HPI/ROS  LIMITATION TO HISTORY   Select: : None  OUTSIDE HISTORIAN(S):  None noted    Tatyana Dallas is a 58 y.o. female who presents to the ED for evaluation of flu-like symptoms onset 8 days ago.  She reports associated body aches, headache, nausea, vomiting, decreased oral intake and post tussive emesis. She denies any diarrhea. She states today she developed left sided chest pain and left abdominal pain that seems to be related to coughing. Per RN, the patient had an episode of syncope in triage.     PAST MEDICAL HISTORY  Past Medical History:   Diagnosis Date    ETOH abuse 2/27/2013    HTN (hypertension) 2/27/2013    Hypertension     Tobacco dependence 2/27/2013       SURGICAL HISTORY  Past Surgical History:   Procedure Laterality Date    OTHER ORTHOPEDIC SURGERY  2001    ankle       FAMILY HISTORY  Family History   Problem Relation Age of Onset    Hypertension Mother     Diabetes Mother     Diabetes Father     Hypertension Father     Heart Disease Father     Cancer Maternal Grandmother 50        breast, uterine    Stroke Neg Hx     Hyperlipidemia Neg Hx     Genetic Disorder Neg Hx     Lung Disease Neg Hx        SOCIAL HISTORY   reports that she has been smoking cigarettes. She has a " "3.0 pack-year smoking history. She has never used smokeless tobacco. She reports current alcohol use. She reports that she does not use drugs.    CURRENT MEDICATIONS  Previous Medications    AMLODIPINE (NORVASC) 10 MG TAB    Take 10 mg by mouth every day.    BENZONATATE (TESSALON) 100 MG CAP    Take 1 Capsule by mouth 3 times a day as needed for Cough.    IBUPROFEN (MOTRIN) 200 MG TAB    Take 400 mg by mouth every 6 hours as needed. Indications: Pain       ALLERGIES  Allergies   Allergen Reactions    Oxycodone      Pt reports that she does not have an allergie to this medication         PHYSICAL EXAM  BP 98/74   Pulse (!) 125   Temp 36.2 °C (97.2 °F) (Temporal)   Resp 18   Ht 1.651 m (5' 5\")   Wt 59.8 kg (131 lb 13.4 oz)   LMP 07/29/2013   SpO2 97%   BMI 21.94 kg/m²    Nursing note and vitals reviewed.  Constitutional: Well-developed and well-nourished. No distress.   HENT: Head is normocephalic and atraumatic. Oropharynx is clear without exudate or erythema. Dry mucous membranes  Eyes: Pupils are equal, round, and reactive to light. Conjunctiva are normal.   Cardiovascular: Tachycardic rate and regular rhythm. No murmur heard. Normal radial pulses.   Pulmonary/Chest: Breath sounds normal. No wheezes or rales. No chest wall tenderness.   Abdominal: Soft and non-tender. No distention, Unable to elicit any abdominal tenderness  Musculoskeletal: Extremities exhibit normal range of motion without edema or tenderness. No calf tenderness or palpable cords.   Neurological: Awake, alert and oriented to person, place, and time. No focal deficits noted.  Skin: Skin is warm and dry. No rash.   Psychiatric: Normal mood and affect. Appropriate for clinical situation    DIAGNOSTIC STUDIES    Labs:   Results for orders placed or performed during the hospital encounter of 01/29/24   CBC with Differential   Result Value Ref Range    WBC 10.8 4.8 - 10.8 K/uL    RBC 3.87 (L) 4.20 - 5.40 M/uL    Hemoglobin 13.5 12.0 - 16.0 g/dL "    Hematocrit 37.9 37.0 - 47.0 %    MCV 97.9 (H) 81.4 - 97.8 fL    MCH 34.9 (H) 27.0 - 33.0 pg    MCHC 35.6 (H) 32.2 - 35.5 g/dL    RDW 43.8 35.9 - 50.0 fL    Platelet Count 284 164 - 446 K/uL    MPV 9.4 9.0 - 12.9 fL    Neutrophils-Polys 68.20 44.00 - 72.00 %    Lymphocytes 26.60 22.00 - 41.00 %    Monocytes 4.20 0.00 - 13.40 %    Eosinophils 0.50 0.00 - 6.90 %    Basophils 0.20 0.00 - 1.80 %    Immature Granulocytes 0.30 0.00 - 0.90 %    Nucleated RBC 0.00 0.00 - 0.20 /100 WBC    Neutrophils (Absolute) 7.40 1.82 - 7.42 K/uL    Lymphs (Absolute) 2.88 1.00 - 4.80 K/uL    Monos (Absolute) 0.46 0.00 - 0.85 K/uL    Eos (Absolute) 0.05 0.00 - 0.51 K/uL    Baso (Absolute) 0.02 0.00 - 0.12 K/uL    Immature Granulocytes (abs) 0.03 0.00 - 0.11 K/uL    NRBC (Absolute) 0.00 K/uL   Complete Metabolic Panel (CMP)   Result Value Ref Range    Sodium 134 (L) 135 - 145 mmol/L    Potassium 2.7 (LL) 3.6 - 5.5 mmol/L    Chloride 92 (L) 96 - 112 mmol/L    Co2 15 (L) 20 - 33 mmol/L    Anion Gap 27.0 (H) 7.0 - 16.0    Glucose 142 (H) 65 - 99 mg/dL    Bun 13 8 - 22 mg/dL    Creatinine 1.27 0.50 - 1.40 mg/dL    Calcium 10.3 (H) 8.4 - 10.2 mg/dL    Correct Calcium 9.7 8.5 - 10.5 mg/dL    AST(SGOT) 21 12 - 45 U/L    ALT(SGPT) 10 2 - 50 U/L    Alkaline Phosphatase 95 30 - 99 U/L    Total Bilirubin 0.8 0.1 - 1.5 mg/dL    Albumin 4.8 3.2 - 4.9 g/dL    Total Protein 7.4 6.0 - 8.2 g/dL    Globulin 2.6 1.9 - 3.5 g/dL    A-G Ratio 1.8 g/dL   Troponins NOW   Result Value Ref Range    Troponin T 8 6 - 19 ng/L   D-DIMER   Result Value Ref Range    D-Dimer <0.27 0.00 - 0.50 ug/mL (FEU)   MAGNESIUM   Result Value Ref Range    Magnesium 1.7 1.5 - 2.5 mg/dL   CoV-2, FLU A/B, and RSV by PCR (2-4 Hours CEPHEID) : Collect NP swab in VTM    Specimen: Respirate   Result Value Ref Range    Influenza virus A RNA Negative Negative    Influenza virus B, PCR Negative Negative    RSV, PCR Negative Negative    SARS-CoV-2 by PCR NotDetected     SARS-CoV-2 Source NP  Swab    ESTIMATED GFR   Result Value Ref Range    GFR (CKD-EPI) 49 (A) >60 mL/min/1.73 m 2   EKG   Result Value Ref Range    Report       [facility] Emergency Dept.    Test Date:  2024  Pt Name:    EDMUND OSORIO                 Department: [facility]  MRN:        [id number]                      Room:  Gender:     Female                       Technician: JESS  :        1965                   Requested By:ER TRIAGE PROTOCOL  Order #:    [id number]                    Reading MD: FREDDY PROCTOR MD    Measurements  Intervals                                Axis  Rate:       124                          P:          73  PA:         132                          QRS:        59  QRSD:       89                           T:          90  QT:         312  QTc:        448    Interpretive Statements  Sinus tachycardia  Borderline repolarization abnormality  Compared to ECG 2023 08:36:03  Sinus rhythm no longer present  Inferior Q waves no longer present  Q waves no longer present  Electronically Signed On 2024 14:45:14 PST by FREDDY PROCTOR MD         EKG:   I have independently interpreted this EKG as detailed above.     Radiology:   This attending emergency physician has independently interpreted the diagnostic imaging associated with this visit and is awaiting the final reading from the radiologist.   Preliminary interpretation is a follows: Negative for any acute cardiopulmonary disease.    Radiologist interpretation:   DX-CHEST-PORTABLE (1 VIEW)   Final Result      No evidence of acute cardiopulmonary process.      EC-ECHOCARDIOGRAM COMPLETE W/ CONT    (Results Pending)        INITIAL ASSESSMENT AND PLAN    2:44 PM - Patient was evaluated at bedside for flu-like symptoms. Ordered for Troponin, CMP, CBC with diff, SARS-CoV-2, Flu A/B, and RSV, Magnesium, d-dimer, UA Culture, EKG, and Dx-chest to evaluate. The patient will be medicated with Tylenol 650 mg for her symptoms. Patient  verbalizes understanding and support with my plan of care.  Differential diagnoses include but not limited to: Dehydration, influenza, viral syndrome, COVID, PE, arhythmia.    ED Observation Status? No; Patient does not meet criteria for ED Observation.      COURSE AND MEDICAL DECISION MAKING  3:35 PM - White blood cell count is normal. No significant anemia. Chest X ray shows no evidence of pneumonia. I feel symptoms are related to viral syndrome. D-dimer negative ruling out PE.    3:52 PM - Patient will be treated with Kdur. She was informed of the plan for hospitalization. Patient verbalizes understanding and agreement to this plan of care.     D-dimer is negative effectively ruling out pulmonary embolism.  Troponin is not elevated.  Patient has significant hypokalemia and dehydration.  Will be admitted for repletion of electrolytes, hydration, and evaluation of chest pain.    HYDRATION: Based on the patient's presentation of Acute Vomiting the patient was given IV fluids. IV Hydration was used because oral hydration was not adequate alone. Upon recheck following hydration, the patient was improved.    DISPOSITION AND DISCUSSIONS    I have discussed management of the patient with the following physicians and OSCAR's:  Dr. Rios, hospitalist    Discussion of management with other \[facility]\"" or appropriate source(s): None     DISPOSITION:  Patient will be hospitalized by Dr. Rios, hospitalist in guarded condition.    FINAL DIAGNOSIS  1. Chest pain, unspecified type    2. Hypokalemia    3. Dehydration    4. Flu-like symptoms         Lisa RODRIGUEZ (Angeloibsimone), am scribing for, and in the presence of, Erik Taylor M.D..    Electronically signed by: Lisa Mahmood (Grace), 1/29/2024    Erik RODRIGUEZ M.D. personally performed the services described in this documentation, as scribed by Lisa Mahmood in my presence, and it is both accurate and complete.      The note accurately reflects work and decisions made by me.  Erik SARMIENTO  ÁNGEL Taylor  1/29/2024  5:22 PM

## 2024-01-29 NOTE — ED NOTES
While sitting in waiting room pt slid from chair to a sitting position on the ground, pt states she passed out. PAR at check in desk observed pt sliding down to floor and states pts eyes were closed for a few second,  did not hit her head. She is AO4, diaphoretic. Was able to get from sitting position on floor to whleechair with assistance. Charge nurse made aware. Pt brought to ER room 4.

## 2024-01-30 ENCOUNTER — APPOINTMENT (OUTPATIENT)
Dept: RADIOLOGY | Facility: MEDICAL CENTER | Age: 59
End: 2024-01-30
Attending: HOSPITALIST
Payer: MEDICAID

## 2024-01-30 ENCOUNTER — APPOINTMENT (OUTPATIENT)
Dept: CARDIOLOGY | Facility: MEDICAL CENTER | Age: 59
End: 2024-01-30
Attending: HOSPITALIST
Payer: MEDICAID

## 2024-01-30 VITALS
HEIGHT: 65 IN | RESPIRATION RATE: 18 BRPM | OXYGEN SATURATION: 99 % | WEIGHT: 137.13 LBS | TEMPERATURE: 98.6 F | HEART RATE: 99 BPM | SYSTOLIC BLOOD PRESSURE: 127 MMHG | BODY MASS INDEX: 22.85 KG/M2 | DIASTOLIC BLOOD PRESSURE: 73 MMHG

## 2024-01-30 PROBLEM — E87.6 HYPOKALEMIA: Status: RESOLVED | Noted: 2024-01-29 | Resolved: 2024-01-30

## 2024-01-30 PROBLEM — E83.42 HYPOMAGNESEMIA: Status: ACTIVE | Noted: 2024-01-30

## 2024-01-30 PROBLEM — E83.42 HYPOMAGNESEMIA: Status: RESOLVED | Noted: 2024-01-30 | Resolved: 2024-01-30

## 2024-01-30 LAB
AMPHET UR QL SCN: NEGATIVE
ANION GAP SERPL CALC-SCNC: 16 MMOL/L (ref 7–16)
APPEARANCE UR: CLEAR
BARBITURATES UR QL SCN: NEGATIVE
BENZODIAZ UR QL SCN: NEGATIVE
BILIRUB UR QL STRIP.AUTO: NEGATIVE
BUN SERPL-MCNC: 8 MG/DL (ref 8–22)
BZE UR QL SCN: NEGATIVE
CALCIUM SERPL-MCNC: 9.5 MG/DL (ref 8.4–10.2)
CANNABINOIDS UR QL SCN: NEGATIVE
CHLORIDE SERPL-SCNC: 100 MMOL/L (ref 96–112)
CHOLEST SERPL-MCNC: 227 MG/DL (ref 100–199)
CO2 SERPL-SCNC: 20 MMOL/L (ref 20–33)
COLOR UR: YELLOW
CREAT SERPL-MCNC: 0.41 MG/DL (ref 0.5–1.4)
ERYTHROCYTE [DISTWIDTH] IN BLOOD BY AUTOMATED COUNT: 44.5 FL (ref 35.9–50)
FENTANYL UR QL: NEGATIVE
GFR SERPLBLD CREATININE-BSD FMLA CKD-EPI: 115 ML/MIN/1.73 M 2
GLUCOSE BLD STRIP.AUTO-MCNC: 166 MG/DL (ref 65–99)
GLUCOSE SERPL-MCNC: 96 MG/DL (ref 65–99)
GLUCOSE UR STRIP.AUTO-MCNC: NEGATIVE MG/DL
HCT VFR BLD AUTO: 31.8 % (ref 37–47)
HDLC SERPL-MCNC: 100 MG/DL
HGB BLD-MCNC: 11.1 G/DL (ref 12–16)
KETONES UR STRIP.AUTO-MCNC: 40 MG/DL
LDLC SERPL CALC-MCNC: 101 MG/DL
LEUKOCYTE ESTERASE UR QL STRIP.AUTO: NEGATIVE
LV EJECT FRACT  99904: 65
LV EJECT FRACT MOD 2C 99903: 44.88
LV EJECT FRACT MOD 4C 99902: 46.38
LV EJECT FRACT MOD BP 99901: 43.66
MAGNESIUM SERPL-MCNC: 1.5 MG/DL (ref 1.5–2.5)
MCH RBC QN AUTO: 34.9 PG (ref 27–33)
MCHC RBC AUTO-ENTMCNC: 34.9 G/DL (ref 32.2–35.5)
MCV RBC AUTO: 100 FL (ref 81.4–97.8)
METHADONE UR QL SCN: NEGATIVE
MICRO URNS: ABNORMAL
NITRITE UR QL STRIP.AUTO: NEGATIVE
OPIATES UR QL SCN: NEGATIVE
OXYCODONE UR QL SCN: NEGATIVE
PCP UR QL SCN: NEGATIVE
PH UR STRIP.AUTO: 6.5 [PH] (ref 5–8)
PLATELET # BLD AUTO: 183 K/UL (ref 164–446)
PMV BLD AUTO: 9.8 FL (ref 9–12.9)
POTASSIUM SERPL-SCNC: 3.4 MMOL/L (ref 3.6–5.5)
PROPOXYPH UR QL SCN: NEGATIVE
PROT UR QL STRIP: NEGATIVE MG/DL
RBC # BLD AUTO: 3.18 M/UL (ref 4.2–5.4)
RBC UR QL AUTO: NEGATIVE
SODIUM SERPL-SCNC: 136 MMOL/L (ref 135–145)
SP GR UR STRIP.AUTO: 1.01
TRIGL SERPL-MCNC: 132 MG/DL (ref 0–149)
WBC # BLD AUTO: 7.5 K/UL (ref 4.8–10.8)

## 2024-01-30 PROCEDURE — 93306 TTE W/DOPPLER COMPLETE: CPT | Mod: 26 | Performed by: INTERNAL MEDICINE

## 2024-01-30 PROCEDURE — 80048 BASIC METABOLIC PNL TOTAL CA: CPT

## 2024-01-30 PROCEDURE — 78452 HT MUSCLE IMAGE SPECT MULT: CPT | Mod: 26 | Performed by: INTERNAL MEDICINE

## 2024-01-30 PROCEDURE — 85027 COMPLETE CBC AUTOMATED: CPT

## 2024-01-30 PROCEDURE — 700111 HCHG RX REV CODE 636 W/ 250 OVERRIDE (IP): Mod: JZ | Performed by: INTERNAL MEDICINE

## 2024-01-30 PROCEDURE — A9270 NON-COVERED ITEM OR SERVICE: HCPCS | Performed by: HOSPITALIST

## 2024-01-30 PROCEDURE — 99239 HOSP IP/OBS DSCHRG MGMT >30: CPT | Performed by: INTERNAL MEDICINE

## 2024-01-30 PROCEDURE — 83735 ASSAY OF MAGNESIUM: CPT

## 2024-01-30 PROCEDURE — 700111 HCHG RX REV CODE 636 W/ 250 OVERRIDE (IP)

## 2024-01-30 PROCEDURE — 36415 COLL VENOUS BLD VENIPUNCTURE: CPT

## 2024-01-30 PROCEDURE — 700102 HCHG RX REV CODE 250 W/ 637 OVERRIDE(OP): Mod: JZ | Performed by: INTERNAL MEDICINE

## 2024-01-30 PROCEDURE — 93306 TTE W/DOPPLER COMPLETE: CPT

## 2024-01-30 PROCEDURE — G0378 HOSPITAL OBSERVATION PER HR: HCPCS

## 2024-01-30 PROCEDURE — 700105 HCHG RX REV CODE 258: Performed by: HOSPITALIST

## 2024-01-30 PROCEDURE — 93018 CV STRESS TEST I&R ONLY: CPT | Performed by: INTERNAL MEDICINE

## 2024-01-30 PROCEDURE — 80307 DRUG TEST PRSMV CHEM ANLYZR: CPT

## 2024-01-30 PROCEDURE — A9502 TC99M TETROFOSMIN: HCPCS

## 2024-01-30 PROCEDURE — 81003 URINALYSIS AUTO W/O SCOPE: CPT | Mod: XU

## 2024-01-30 PROCEDURE — A9270 NON-COVERED ITEM OR SERVICE: HCPCS | Mod: JZ | Performed by: INTERNAL MEDICINE

## 2024-01-30 PROCEDURE — 700102 HCHG RX REV CODE 250 W/ 637 OVERRIDE(OP): Performed by: HOSPITALIST

## 2024-01-30 PROCEDURE — 80061 LIPID PANEL: CPT

## 2024-01-30 RX ORDER — POTASSIUM CHLORIDE 20 MEQ/1
40 TABLET, EXTENDED RELEASE ORAL ONCE
Status: COMPLETED | OUTPATIENT
Start: 2024-01-30 | End: 2024-01-30

## 2024-01-30 RX ORDER — MAGNESIUM SULFATE HEPTAHYDRATE 40 MG/ML
2 INJECTION, SOLUTION INTRAVENOUS ONCE
Status: COMPLETED | OUTPATIENT
Start: 2024-01-30 | End: 2024-01-30

## 2024-01-30 RX ORDER — REGADENOSON 0.08 MG/ML
INJECTION, SOLUTION INTRAVENOUS
Status: COMPLETED
Start: 2024-01-30 | End: 2024-01-30

## 2024-01-30 RX ADMIN — POTASSIUM CHLORIDE 40 MEQ: 1500 TABLET, EXTENDED RELEASE ORAL at 08:23

## 2024-01-30 RX ADMIN — OMEPRAZOLE 20 MG: 20 CAPSULE, DELAYED RELEASE ORAL at 05:42

## 2024-01-30 RX ADMIN — AMLODIPINE BESYLATE 10 MG: 5 TABLET ORAL at 05:43

## 2024-01-30 RX ADMIN — ACETAMINOPHEN 650 MG: 325 TABLET ORAL at 08:23

## 2024-01-30 RX ADMIN — SODIUM CHLORIDE: 9 INJECTION, SOLUTION INTRAVENOUS at 07:19

## 2024-01-30 RX ADMIN — REGADENOSON 0.4 MG: 0.08 INJECTION, SOLUTION INTRAVENOUS at 10:44

## 2024-01-30 RX ADMIN — MAGNESIUM SULFATE HEPTAHYDRATE 2 G: 2 INJECTION, SOLUTION INTRAVENOUS at 08:24

## 2024-01-30 RX ADMIN — ASPIRIN 325 MG: 325 TABLET ORAL at 05:42

## 2024-01-30 ASSESSMENT — FIBROSIS 4 INDEX: FIB4 SCORE: 2.03

## 2024-01-30 ASSESSMENT — PAIN DESCRIPTION - PAIN TYPE: TYPE: ACUTE PAIN

## 2024-01-30 NOTE — H&P
"Hospital Medicine History & Physical Note    Date of Service  1/29/2024    Primary Care Physician  Rashmi Sigala M.D.    Consultants  None    Specialist Names: None    Code Status  Full Code    Chief Complaint  Chief Complaint   Patient presents with    Chest Pain     Left anterior chest pain \"point under left breast\" states pain started 1 hour ago while taking a walk \"7/10\"  and \"I can't take a deep breath. The pain goes away but comes back when I start walking.\"    Flu Like Symptoms     X7 days \"vomiting, chills, cough\" denies any sick contacts    Abdominal Pain     Left side abd pain onset 1 hour ago at same time as chest pain, +nausea vomiting, last emesis this morning.        History of Presenting Illness  Tatyana Dallas is a 58 y.o. female who presented 1/29/2024 with dizziness, nausea vomiting, headache, generalized muscle aches, loss of appetite, chest pain.  The patient says her symptoms began 8 days ago.  She today however developed chest pain that is substernal rating to the shoulder and neck accompanied by nausea vomiting diaphoresis and shortness of breath.  She does have a history of tobacco use and ongoing tobacco use.  She also has family history of chest pain and coronary artery disease.  Patient at this point will need a cardiac workup.  She will need fluid resuscitation and if establishment of her viral etiology versus bacterial etiology to cause her to be sick in the first place..    I discussed the plan of care with patient, bedside RN, , and emergency room physician Dr. Erik Taylor .    Review of Systems  Review of Systems   Constitutional:  Positive for chills, diaphoresis, fever and malaise/fatigue.   HENT: Negative.  Negative for hearing loss, nosebleeds and sore throat.    Eyes: Negative.  Negative for double vision.   Respiratory:  Positive for shortness of breath. Negative for cough, hemoptysis and wheezing.    Cardiovascular:  Positive for chest pain. Negative for " palpitations and leg swelling.   Gastrointestinal:  Positive for heartburn, nausea and vomiting. Negative for abdominal pain, blood in stool, constipation and diarrhea.   Genitourinary: Negative.  Negative for frequency, hematuria and urgency.   Musculoskeletal:  Positive for back pain and myalgias. Negative for joint pain.   Skin: Negative.  Negative for itching and rash.   Neurological:  Positive for dizziness, weakness and headaches. Negative for focal weakness, seizures and loss of consciousness.   Endo/Heme/Allergies: Negative.  Does not bruise/bleed easily.   Psychiatric/Behavioral:  Positive for substance abuse (Nicotine). Negative for depression and suicidal ideas. The patient is not nervous/anxious.    All other systems reviewed and are negative.      Past Medical History   has a past medical history of ETOH abuse (2/27/2013), HTN (hypertension) (2/27/2013), Hypertension, and Tobacco dependence (2/27/2013).    Surgical History   has a past surgical history that includes other orthopedic surgery (2001).     Family History  family history includes Cancer (age of onset: 50) in her maternal grandmother; Diabetes in her father and mother; Heart Disease in her father; Hypertension in her father and mother.   Family history reviewed with patient. There is family history that is pertinent to the chief complaint.     Social History   reports that she has been smoking cigarettes. She has a 3.0 pack-year smoking history. She has never used smokeless tobacco. She reports current alcohol use. She reports that she does not use drugs.    Allergies  Allergies   Allergen Reactions    Oxycodone      Pt reports that she does not have an allergie to this medication        Medications  Prior to Admission Medications   Prescriptions Last Dose Informant Patient Reported? Taking?   amLODIPine (NORVASC) 10 MG Tab 1/29/2024 at 0800 Patient Yes No   Sig: Take 10 mg by mouth every day.   aspirin (ASA) 325 MG Tab 1/29/2024 at 1200  Patient Yes Yes   Sig: Take 650 mg by mouth every 6 hours as needed. Indications: Pain      Facility-Administered Medications: None       Physical Exam  Temp:  [36.2 °C (97.2 °F)-36.6 °C (97.8 °F)] 36.6 °C (97.8 °F)  Pulse:  [] 115  Resp:  [14-20] 18  BP: ()/(65-90) 151/90  SpO2:  [97 %-100 %] 100 %  Blood Pressure: (!) 151/90 (RN Notified)   Temperature: 36.6 °C (97.8 °F)   Pulse: (!) 115   Respiration: 18   Pulse Oximetry: 100 %       Physical Exam  Vitals and nursing note reviewed. Exam conducted with a chaperone present.   Constitutional:       Appearance: She is well-developed. She is ill-appearing.   HENT:      Head: Normocephalic and atraumatic.      Right Ear: External ear normal.      Left Ear: External ear normal.      Nose: Nose normal.      Mouth/Throat:      Mouth: Mucous membranes are dry.      Pharynx: No oropharyngeal exudate or posterior oropharyngeal erythema.   Eyes:      Conjunctiva/sclera: Conjunctivae normal.      Pupils: Pupils are equal, round, and reactive to light.   Neck:      Thyroid: No thyromegaly.      Vascular: No JVD.   Cardiovascular:      Rate and Rhythm: Regular rhythm. Tachycardia present.      Heart sounds: Murmur heard.   Pulmonary:      Effort: Pulmonary effort is normal.      Breath sounds: Normal breath sounds.   Chest:      Chest wall: No tenderness.   Abdominal:      General: Abdomen is flat. Bowel sounds are normal. There is no distension.      Palpations: Abdomen is soft. There is no mass.      Tenderness: There is abdominal tenderness. There is no guarding or rebound.   Musculoskeletal:         General: Normal range of motion.      Cervical back: Normal range of motion and neck supple.      Right lower leg: No edema.   Lymphadenopathy:      Cervical: No cervical adenopathy.   Skin:     General: Skin is warm and dry.      Capillary Refill: Capillary refill takes less than 2 seconds.      Findings: No rash.   Neurological:      General: No focal deficit  "present.      Mental Status: She is alert and oriented to person, place, and time. Mental status is at baseline.      Cranial Nerves: No cranial nerve deficit.      Deep Tendon Reflexes: Reflexes are normal and symmetric.   Psychiatric:         Mood and Affect: Mood normal.         Behavior: Behavior normal.         Thought Content: Thought content normal.         Judgment: Judgment normal.         Laboratory:  Recent Labs     01/29/24  1452   WBC 10.8   RBC 3.87*   HEMOGLOBIN 13.5   HEMATOCRIT 37.9   MCV 97.9*   MCH 34.9*   MCHC 35.6*   RDW 43.8   PLATELETCT 284   MPV 9.4     Recent Labs     01/29/24  1452   SODIUM 134*   POTASSIUM 2.7*   CHLORIDE 92*   CO2 15*   GLUCOSE 142*   BUN 13   CREATININE 1.27   CALCIUM 10.3*     Recent Labs     01/29/24  1452   ALTSGPT 10   ASTSGOT 21   ALKPHOSPHAT 95   TBILIRUBIN 0.8   GLUCOSE 142*         No results for input(s): \"NTPROBNP\" in the last 72 hours.      Recent Labs     01/29/24  1452 01/29/24  1701   TROPONINT 8 <6       Imaging:  DX-CHEST-PORTABLE (1 VIEW)   Final Result      No evidence of acute cardiopulmonary process.      EC-ECHOCARDIOGRAM COMPLETE W/ CONT    (Results Pending)   NM-CARDIAC STRESS TEST    (Results Pending)       X-Ray:  I have personally reviewed the images and compared with prior images.  EKG:  I have personally reviewed the images and compared with prior images.    Assessment/Plan:  Justification for Admission Status  I anticipate this patient is appropriate for observation status at this time because patient at this point will need aggressive fluid resuscitation for dehydration, evaluation of viral etiology, or chest pain workup and this will require 23 hours or less of hospital management.    Patient will need a Telemetry bed on MEDICAL service .  The need is secondary to chest pain with nausea vomiting and viral syndrome.    * Hypokalemia- (present on admission)  Assessment & Plan  Patient states she has been having nausea vomiting and viral-like " illness for the past 8 days and has developed severe dehydration and hypokalemia  Give potassium supplementation and monitor potassium levels  Magnesium level was checked and is 1.7, currently no replacement indicated but we will continue to monitor magnesium levels and replace as needed    Chest pain  Assessment & Plan  The ASCVD Risk score (Candy CHAVEZ, et al., 2019) failed to calculate for the following reasons:    Cannot find a previous HDL lab    Cannot find a previous total cholesterol lab  Patient does smoke  Patient has risk factor with coronary artery disease to her family  Patient's been having chest pain that is on the left side radiating to the shoulder accompanied by nausea vomiting diaphoresis and shortness of breath  Serial troponins  Echocardiogram  Stress test in the morning    Dehydration  Assessment & Plan  Fluid resuscitation  Monitor intake and output  Monitor blood pressure    Viral syndrome- (present on admission)  Assessment & Plan  Patient has been having a viral infection for the past 8 days  Patient will need at this point a respiratory PCR  Continue fluid resuscitation  Antiemetic management  Antipyretic management  Negative for RSV, influenza and COVID.    ETOH abuse- (present on admission)  Assessment & Plan  History of alcohol abuse  Patient says currently she is not drinking and she does not anticipate having any withdrawal signs    HTN (hypertension)- (present on admission)  Assessment & Plan  Optimize blood pressure management keep systolic blood pressure less than 140 diastolic under 90  Continue meds 10 mg daily    Tobacco dependence- (present on admission)  Assessment & Plan  Discussed tobacco cessation with the patient at this point she says she is down to 2 cigarettes.  She has been smoking for 30 years.  Patient at this point needs tobacco withdrawal management  Discussion at length held with her.  Regarding smoking cessation  Currently she is working where she does not need any  withdrawal management  at this point.              VTE prophylaxis: SCDs/TEDs

## 2024-01-30 NOTE — ASSESSMENT & PLAN NOTE
Optimize blood pressure management keep systolic blood pressure less than 140 diastolic under 90  Continue meds 10 mg daily

## 2024-01-30 NOTE — CARE PLAN
The patient is Stable - Low risk of patient condition declining or worsening    Shift Goals  Clinical Goals: monitor chest pain, echo, stress test  Patient Goals: go home  Family Goals: Unable to assess    Progress made toward(s) clinical / shift goals:  echo complete. Stress test at 0930    Patient is not progressing towards the following goals:      Problem: Pain - Standard  Goal: Alleviation of pain or a reduction in pain to the patient’s comfort goal  Outcome: Progressing  Note: Pain in chest improved. Patient has headache.      Problem: Knowledge Deficit - Standard  Goal: Patient and family/care givers will demonstrate understanding of plan of care, disease process/condition, diagnostic tests and medications  Outcome: Progressing  Note: NPO for stress test. Echo complete

## 2024-01-30 NOTE — CARE PLAN
The patient is Watcher - Medium risk of patient condition declining or worsening         Progress made toward(s) clinical / shift goals:  Pt given tums for heartburn, aware of orders for stress test.    Patient is not progressing towards the following goals:

## 2024-01-30 NOTE — PROGRESS NOTES
"Received report from Arpita, at pt bedside. Pt reported heart burn and some pain in her LUQ that \"comes and goes\", reached out to MD Rios.  POC discussed. Call light and belongings within reach. Bed locked and in low position. Fall precautions in place, non-slid socks given.    "

## 2024-01-30 NOTE — ASSESSMENT & PLAN NOTE
The ASCVD Risk score (Candy CHAVEZ, et al., 2019) failed to calculate for the following reasons:    Cannot find a previous HDL lab    Cannot find a previous total cholesterol lab  Patient does smoke  Patient has risk factor with coronary artery disease to her family  Patient's been having chest pain that is on the left side radiating to the shoulder accompanied by nausea vomiting diaphoresis and shortness of breath  Serial troponins  Echocardiogram  Stress test in the morning

## 2024-01-30 NOTE — ASSESSMENT & PLAN NOTE
Patient has been having a viral infection for the past 8 days  Patient will need at this point a respiratory PCR  Continue fluid resuscitation  Antiemetic management  Antipyretic management  Negative for RSV, influenza and COVID.

## 2024-01-30 NOTE — PROGRESS NOTES
Telemetry Shift Summary     Rhythm SR-ST  HR Range    Ectopy rPVC, rPAC  Measurements  0.14/0.08/0.32     Normal Values  Rhythm SR  HR Range    Measurements 0.12-0.20 / 0.06-0.10  / 0.30-0.52

## 2024-01-30 NOTE — PROGRESS NOTES
4 Eyes Skin Assessment Completed by SY Simpson and SY Palm.    Head WDL  Ears WDL  Nose WDL  Mouth WDL  Neck WDL  Breast/Chest WDL  Shoulder Blades WDL  Spine WDL  (R) Arm/Elbow/Hand WDL  (L) Arm/Elbow/Hand WDL  Abdomen WDL  Groin WDL  Scrotum/Coccyx/Buttocks WDL  (R) Leg WDL  (L) Leg WDL  (R) Heel/Foot/Toe WDL  (L) Heel/Foot/Toe WDL          Devices In Places Blood Pressure Cuff and Pulse Ox      Interventions In Place Pillows    Possible Skin Injury No    Pictures Uploaded Into Epic N/A  Wound Consult Placed N/A  RN Wound Prevention Protocol Ordered No

## 2024-01-30 NOTE — ASSESSMENT & PLAN NOTE
Patient states she has been having nausea vomiting and viral-like illness for the past 8 days and has developed severe dehydration and hypokalemia  Give potassium supplementation and monitor potassium levels  Magnesium level was checked and is 1.7, currently no replacement indicated but we will continue to monitor magnesium levels and replace as needed

## 2024-01-30 NOTE — PROGRESS NOTES
Pt arrived on unit. POC reviewed with pt. Pt verbalized understanding. IV running fluids with potassium. Safety measures in place.

## 2024-01-30 NOTE — ASSESSMENT & PLAN NOTE
History of alcohol abuse  Patient says currently she is not drinking and she does not anticipate having any withdrawal signs

## 2024-01-30 NOTE — ASSESSMENT & PLAN NOTE
Discussed tobacco cessation with the patient at this point she says she is down to 2 cigarettes.  She has been smoking for 30 years.  Patient at this point needs tobacco withdrawal management  Discussion at length held with her.  Regarding smoking cessation  Currently she is working where she does not need any withdrawal management  at this point.

## 2024-01-30 NOTE — CARE PLAN
The patient is Watcher - Medium risk of patient condition declining or worsening    Shift Goals  Clinical Goals: Monitor for Chest Pain, Monitor labs, Safety  Patient Goals: Sleep, get better  Family Goals: Unable to assess    Progress made toward(s) clinical / shift goals:    Problem: Pain - Standard  Goal: Alleviation of pain or a reduction in pain to the patient’s comfort goal  Outcome: Progressing  Note: Patient initially reported 9/10 pain/discomfort due to her heart burn. Escalated concerns to MD. PRN orders received. Pt given milk to aid with discomfort. Patient reported discomfort to be alleviated. Patient encouraged to voice feelings of discomfort and pain.      Problem: Knowledge Deficit - Standard  Goal: Patient and family/care givers will demonstrate understanding of plan of care, disease process/condition, diagnostic tests and medications  Outcome: Progressing  Note: Educated patient on POC, Stress test, diet order, and general medication information. Patient reported verbal understanding of education.      Problem: Fall Risk  Goal: Patient will remain free from falls  Outcome: Progressing  Note: Assessed for fall risk factors and implemented fall precautions. Educated patient on bed alarm and increased risk for falls. Patient reported verbal understanding of education.        Patient is not progressing towards the following goals:

## 2024-01-31 ENCOUNTER — TELEPHONE (OUTPATIENT)
Dept: HEALTH INFORMATION MANAGEMENT | Facility: OTHER | Age: 59
End: 2024-01-31
Payer: MEDICAID

## 2024-01-31 NOTE — DISCHARGE SUMMARY
"Discharge Summary    CHIEF COMPLAINT ON ADMISSION  Chief Complaint   Patient presents with    Chest Pain     Left anterior chest pain \"point under left breast\" states pain started 1 hour ago while taking a walk \"7/10\"  and \"I can't take a deep breath. The pain goes away but comes back when I start walking.\"    Flu Like Symptoms     X7 days \"vomiting, chills, cough\" denies any sick contacts    Abdominal Pain     Left side abd pain onset 1 hour ago at same time as chest pain, +nausea vomiting, last emesis this morning.        Reason for Admission  Chest Pains, Abdominal Pains, Head*     Admission Date  1/29/2024    CODE STATUS  Full code    HPI & HOSPITAL COURSE  This is \"Tatyana Dallas is a 58 y.o. female who presented 1/29/2024 with dizziness, nausea vomiting, headache, generalized muscle aches, loss of appetite, chest pain.  The patient says her symptoms began 8 days ago.  She today however developed chest pain that is substernal rating to the shoulder and neck accompanied by nausea vomiting diaphoresis and shortness of breath.  She does have a history of tobacco use and ongoing tobacco use.  She also has family history of chest pain and coronary artery disease.  Patient at this point will need a cardiac workup.  She will need fluid resuscitation and if establishment of her viral etiology versus bacterial etiology to cause her to be sick in the first place. \" (As per admitting physician Dr. Ozuna on H&P).    Patient had a Lexiscan stress test today which showed Small sized mild severity, nonreversible defect in the distal lateral and mid inferior lateral wall.  Patient states she does have a history of prolonged tobacco use for over 30 years, she also has had heavy alcohol use in the past.  She states she is trying to quit smoking at this moment, down to 2 cigarettes/day.  I recommended and counseled her on complete's tobacco smoking cessation as this is harming her heart.  I also evaluated her echocardiogram " showed an LVEF of 65% with grade 1 diastolic dysfunction, mild TR, RVSP 33 mmHg.  Given this information I explained to the patient she may be starting to have pulmonary effects from tobacco use including any potential reactive airway disease.    Patient stated she had no more chest pain or any type of concerns.  She stated she had been having some syncopal events but based on her description it appears to be vasovagal in nature.  I explained to her she may be low on blood pressure causing this type of syncopal events that she did come in with a BP of 98/74 with with a heart rate of 125. She also had a severe hypokalemia at 2.7 which would match poor nutritional state and low fluid intake at home.    Therefore, she is discharged in good and stable condition to home with close outpatient follow-up.    The patient recovered much more quickly than anticipated on admission.    Discharge Date  1/30/2024    FOLLOW UP ITEMS POST DISCHARGE  With PCP    DISCHARGE DIAGNOSES  Principal Problem (Resolved):    Hypokalemia (POA: Yes)  Active Problems:    Tobacco dependence (POA: Yes)    HTN (hypertension) (POA: Yes)    ETOH abuse (POA: Yes)      Overview: IMO load March 2020    Viral syndrome (POA: Yes)    Dehydration (POA: Yes)    Chest pain (POA: Yes)  Resolved Problems:    Hypomagnesemia (POA: Yes)      FOLLOW UP  No future appointments.  Rashmi Sigala M.D.  [address] [place] [id number]  [phone]    Schedule an appointment as soon as possible for a visit  Please make an appointment for posthospital visit in 1 to 2 weeks      MEDICATIONS ON DISCHARGE     Medication List        CONTINUE taking these medications        Instructions   amLODIPine 10 MG Tabs  Commonly known as: Norvasc   Take 10 mg by mouth every day.  Dose: 10 mg     aspirin 325 MG Tabs  Commonly known as: Asa   Take 650 mg by mouth every 6 hours as needed. Indications: Pain  Dose: 650 mg              Allergies  Allergies   Allergen Reactions    Oxycodone       Pt reports that she does not have an allergie to this medication        DIET  Cardiac diet    ACTIVITY  As tolerated.  Weight bearing as tolerated    CONSULTATIONS  None    PROCEDURES  NM Lexiscan stress test    LABORATORY  Lab Results   Component Value Date    SODIUM 136 01/30/2024    POTASSIUM 3.4 (L) 01/30/2024    CHLORIDE 100 01/30/2024    CO2 20 01/30/2024    GLUCOSE 96 01/30/2024    BUN 8 01/30/2024    CREATININE 0.41 (L) 01/30/2024        Lab Results   Component Value Date    WBC 7.5 01/30/2024    HEMOGLOBIN 11.1 (L) 01/30/2024    HEMATOCRIT 31.8 (L) 01/30/2024    PLATELETCT 183 01/30/2024      NM-CARDIAC STRESS TEST   Final Result      EC-ECHOCARDIOGRAM COMPLETE W/O CONT   Final Result      DX-CHEST-PORTABLE (1 VIEW)   Final Result      No evidence of acute cardiopulmonary process.          Total time of the discharge process exceeds 32 minutes.

## 2024-12-08 ENCOUNTER — HOSPITAL ENCOUNTER (EMERGENCY)
Facility: MEDICAL CENTER | Age: 59
End: 2024-12-08
Attending: EMERGENCY MEDICINE
Payer: MEDICAID

## 2024-12-08 ENCOUNTER — APPOINTMENT (OUTPATIENT)
Dept: RADIOLOGY | Facility: MEDICAL CENTER | Age: 59
End: 2024-12-08
Attending: EMERGENCY MEDICINE
Payer: MEDICAID

## 2024-12-08 VITALS
HEIGHT: 65 IN | SYSTOLIC BLOOD PRESSURE: 142 MMHG | BODY MASS INDEX: 22.26 KG/M2 | DIASTOLIC BLOOD PRESSURE: 86 MMHG | RESPIRATION RATE: 18 BRPM | TEMPERATURE: 97.8 F | HEART RATE: 96 BPM | OXYGEN SATURATION: 99 % | WEIGHT: 133.6 LBS

## 2024-12-08 DIAGNOSIS — R05.1 ACUTE COUGH: ICD-10-CM

## 2024-12-08 DIAGNOSIS — F10.930 ALCOHOL WITHDRAWAL SYNDROME WITHOUT COMPLICATION (HCC): ICD-10-CM

## 2024-12-08 LAB
ALBUMIN SERPL BCP-MCNC: 4.1 G/DL (ref 3.2–4.9)
ALBUMIN/GLOB SERPL: 1.5 G/DL
ALP SERPL-CCNC: 83 U/L (ref 30–99)
ALT SERPL-CCNC: 12 U/L (ref 2–50)
ANION GAP SERPL CALC-SCNC: 16 MMOL/L (ref 7–16)
AST SERPL-CCNC: 20 U/L (ref 12–45)
BASOPHILS # BLD AUTO: 0.3 % (ref 0–1.8)
BASOPHILS # BLD: 0.02 K/UL (ref 0–0.12)
BILIRUB SERPL-MCNC: 0.4 MG/DL (ref 0.1–1.5)
BUN SERPL-MCNC: 10 MG/DL (ref 8–22)
CALCIUM ALBUM COR SERPL-MCNC: 9.1 MG/DL (ref 8.5–10.5)
CALCIUM SERPL-MCNC: 9.2 MG/DL (ref 8.4–10.2)
CHLORIDE SERPL-SCNC: 100 MMOL/L (ref 96–112)
CO2 SERPL-SCNC: 20 MMOL/L (ref 20–33)
CREAT SERPL-MCNC: 0.76 MG/DL (ref 0.5–1.4)
EKG IMPRESSION: NORMAL
EOSINOPHIL # BLD AUTO: 0.04 K/UL (ref 0–0.51)
EOSINOPHIL NFR BLD: 0.6 % (ref 0–6.9)
ERYTHROCYTE [DISTWIDTH] IN BLOOD BY AUTOMATED COUNT: 47.1 FL (ref 35.9–50)
FLUAV RNA SPEC QL NAA+PROBE: NEGATIVE
FLUBV RNA SPEC QL NAA+PROBE: NEGATIVE
GFR SERPLBLD CREATININE-BSD FMLA CKD-EPI: 91 ML/MIN/1.73 M 2
GLOBULIN SER CALC-MCNC: 2.7 G/DL (ref 1.9–3.5)
GLUCOSE SERPL-MCNC: 91 MG/DL (ref 65–99)
HCT VFR BLD AUTO: 38.6 % (ref 37–47)
HGB BLD-MCNC: 13.2 G/DL (ref 12–16)
IMM GRANULOCYTES # BLD AUTO: 0.02 K/UL (ref 0–0.11)
IMM GRANULOCYTES NFR BLD AUTO: 0.3 % (ref 0–0.9)
LYMPHOCYTES # BLD AUTO: 1.68 K/UL (ref 1–4.8)
LYMPHOCYTES NFR BLD: 26.4 % (ref 22–41)
MCH RBC QN AUTO: 33 PG (ref 27–33)
MCHC RBC AUTO-ENTMCNC: 34.2 G/DL (ref 32.2–35.5)
MCV RBC AUTO: 96.5 FL (ref 81.4–97.8)
MONOCYTES # BLD AUTO: 0.41 K/UL (ref 0–0.85)
MONOCYTES NFR BLD AUTO: 6.4 % (ref 0–13.4)
NEUTROPHILS # BLD AUTO: 4.2 K/UL (ref 1.82–7.42)
NEUTROPHILS NFR BLD: 66 % (ref 44–72)
NRBC # BLD AUTO: 0 K/UL
NRBC BLD-RTO: 0 /100 WBC (ref 0–0.2)
PLATELET # BLD AUTO: 254 K/UL (ref 164–446)
PMV BLD AUTO: 8.9 FL (ref 9–12.9)
POTASSIUM SERPL-SCNC: 3.8 MMOL/L (ref 3.6–5.5)
PROT SERPL-MCNC: 6.8 G/DL (ref 6–8.2)
RBC # BLD AUTO: 4 M/UL (ref 4.2–5.4)
RSV RNA SPEC QL NAA+PROBE: NEGATIVE
SARS-COV-2 RNA RESP QL NAA+PROBE: NOTDETECTED
SODIUM SERPL-SCNC: 136 MMOL/L (ref 135–145)
SPECIMEN SOURCE: NORMAL
TROPONIN T SERPL-MCNC: <6 NG/L (ref 6–19)
WBC # BLD AUTO: 6.4 K/UL (ref 4.8–10.8)

## 2024-12-08 PROCEDURE — 36415 COLL VENOUS BLD VENIPUNCTURE: CPT

## 2024-12-08 PROCEDURE — 80053 COMPREHEN METABOLIC PANEL: CPT

## 2024-12-08 PROCEDURE — 84484 ASSAY OF TROPONIN QUANT: CPT

## 2024-12-08 PROCEDURE — 99283 EMERGENCY DEPT VISIT LOW MDM: CPT

## 2024-12-08 PROCEDURE — A9270 NON-COVERED ITEM OR SERVICE: HCPCS | Performed by: EMERGENCY MEDICINE

## 2024-12-08 PROCEDURE — 93005 ELECTROCARDIOGRAM TRACING: CPT | Mod: TC | Performed by: EMERGENCY MEDICINE

## 2024-12-08 PROCEDURE — 71045 X-RAY EXAM CHEST 1 VIEW: CPT

## 2024-12-08 PROCEDURE — 85025 COMPLETE CBC W/AUTO DIFF WBC: CPT

## 2024-12-08 PROCEDURE — 0241U HCHG SARS-COV-2 COVID-19 NFCT DS RESP RNA 4 TRGT MIC: CPT

## 2024-12-08 PROCEDURE — 700102 HCHG RX REV CODE 250 W/ 637 OVERRIDE(OP): Performed by: EMERGENCY MEDICINE

## 2024-12-08 RX ORDER — LORAZEPAM 1 MG/1
1 TABLET ORAL ONCE
Status: COMPLETED | OUTPATIENT
Start: 2024-12-08 | End: 2024-12-08

## 2024-12-08 RX ORDER — CHLORDIAZEPOXIDE HYDROCHLORIDE 25 MG/1
CAPSULE, GELATIN COATED ORAL
Qty: 14 CAPSULE | Refills: 0 | Status: SHIPPED | OUTPATIENT
Start: 2024-12-08 | End: 2024-12-12

## 2024-12-08 RX ADMIN — LORAZEPAM 1 MG: 1 TABLET ORAL at 10:20

## 2024-12-08 ASSESSMENT — FIBROSIS 4 INDEX: FIB4 SCORE: 2.07

## 2024-12-08 NOTE — ED TRIAGE NOTES
"Chief Complaint   Patient presents with    Cough     X 1 week  \"Comes and goes\"     /86   Pulse (!) 117   Temp 36.3 °C (97.4 °F) (Temporal)   Resp 20   Ht 1.651 m (5' 5\")   Wt 60.6 kg (133 lb 9.6 oz)   LMP 07/29/2013   SpO2 94%   BMI 22.23 kg/m²     Pt ambulated to ED by self for c/o ongoing cough x 1 week, states found out neighbor has Covid and is concerned may now have Covid.    "

## 2024-12-08 NOTE — DISCHARGE INSTRUCTIONS
You were seen in the ER for cough.  You also appear to be in mild alcohol withdrawal.  You have indicated that she would like some resources for alcohol abuse which I provided to you as well as a prescription for Librium which will help you medically detox.  You have declined inpatient management for alcohol withdrawal.  Thankfully your labs and imaging today are reassuring.  You are free to return to work tomorrow.  You should follow-up with your primary provider this week for recheck.  Return immediately if you have any new or worsening symptoms.  I hope you feel better soon!

## 2024-12-08 NOTE — ED NOTES
Pt ambulates to triage with complaints of a productive cough. Pt states she was in contact with someone who tested positive for covid over a week ago.

## 2024-12-08 NOTE — ED PROVIDER NOTES
"ED Provider Note    CHIEF COMPLAINT  Chief Complaint   Patient presents with    Cough     X 1 week  \"Comes and goes\"       EXTERNAL RECORDS REVIEWED  Inpatient Notes admitted to this facility at the end of January 2024 for chest pain, flulike symptoms, and abdominal pain.  She had a Lexiscan stress test which showed a small sized mild severity nonreversible defect in the distal lateral and mid inferior lateral wall.  She had a history of prolonged tobacco use over the past 30 years and at that time told staff that she was down to 2 cigarettes/day.  LVEF on echocardiogram was 65% with grade 1 diastolic dysfunction, mild tricuspid regurgitation, and RVSP of 33 mmHg.  She also reported syncopal events which sounded vasovagal.  Her blood pressure upon arrival was 98 systolic with a heart rate in the 120s.  She also had a severe hypokalemia at 2.7.  She was discharged to follow-up as an outpatient.    HPI/ROS  LIMITATION TO HISTORY   Select: : None  OUTSIDE HISTORIAN(S):  None    Tatyana Dallas is a 59 y.o. female who presents with a cough for the past 5 or 6 days.  Patient states her neighbor was recently diagnosed with COVID and she is concerned that she might now have COVID as well.  She has not had any fevers or chills.  Her cough is nonproductive.  He denies any hemoptysis or leg swelling.  She has never had a DVT or PE.  She has no chest pain or shortness of breath.  She does have some mild abdominal discomfort from coughing so much.  She admits to significant alcohol use, typically drinking approximately 1 pint a day.  Her last drink was today.  She feels as though she might be withdrawing a little.  She is still smoking cigarettes.  She is ready to permanently discontinue alcohol use.    PAST MEDICAL HISTORY   has a past medical history of ETOH abuse (2/27/2013), HTN (hypertension) (2/27/2013), Hypertension, and Tobacco dependence (2/27/2013).    SURGICAL HISTORY   has a past surgical history that includes " "other orthopedic surgery (2001).    FAMILY HISTORY  Family History   Problem Relation Age of Onset    Hypertension Mother     Diabetes Mother     Diabetes Father     Hypertension Father     Heart Disease Father     Cancer Maternal Grandmother 50        breast, uterine    Stroke Neg Hx     Hyperlipidemia Neg Hx     Genetic Disorder Neg Hx     Lung Disease Neg Hx        SOCIAL HISTORY  Social History     Tobacco Use    Smoking status: Every Day     Current packs/day: 0.10     Average packs/day: 0.1 packs/day for 30.0 years (3.0 ttl pk-yrs)     Types: Cigarettes    Smokeless tobacco: Never   Vaping Use    Vaping status: Never Used   Substance and Sexual Activity    Alcohol use: Yes    Drug use: No    Sexual activity: Yes     Partners: Male       CURRENT MEDICATIONS  Home Medications    **Home medications have not yet been reviewed for this encounter**         ALLERGIES  Allergies   Allergen Reactions    Oxycodone      Pt reports that she does not have an allergie to this medication        PHYSICAL EXAM  VITAL SIGNS: /86   Pulse (!) 117   Temp 36.3 °C (97.4 °F) (Temporal)   Resp 20   Ht 1.651 m (5' 5\")   Wt 60.6 kg (133 lb 9.6 oz)   LMP 07/29/2013   SpO2 94%   BMI 22.23 kg/m²    Physical Exam  Vitals and nursing note reviewed.   Constitutional:       Appearance: Normal appearance.   HENT:      Head: Normocephalic and atraumatic.      Right Ear: External ear normal.      Left Ear: External ear normal.      Nose: Nose normal.      Mouth/Throat:      Mouth: Mucous membranes are moist.      Pharynx: Oropharynx is clear.   Eyes:      Extraocular Movements: Extraocular movements intact.      Conjunctiva/sclera: Conjunctivae normal.      Pupils: Pupils are equal, round, and reactive to light.   Cardiovascular:      Rate and Rhythm: Regular rhythm. Tachycardia present.      Pulses: Normal pulses.   Pulmonary:      Effort: Pulmonary effort is normal.      Breath sounds: Normal breath sounds.   Abdominal:      " Palpations: Abdomen is soft.      Tenderness: There is no abdominal tenderness.   Musculoskeletal:         General: Normal range of motion.      Cervical back: Normal range of motion and neck supple.      Right lower leg: No edema.      Left lower leg: No edema.   Skin:     General: Skin is warm and dry.   Neurological:      General: No focal deficit present.      Mental Status: She is alert and oriented to person, place, and time.   Psychiatric:         Mood and Affect: Mood normal.         Behavior: Behavior normal.       EKG/LABS  Results for orders placed or performed during the hospital encounter of 24   CoV-2, FLU A/B, and RSV by PCR (2-4 Hours CEPHEID) : Collect NP swab in [facility]    Collection Time: 24  9:15 AM    Specimen: Respirate   Result Value Ref Range    Influenza virus A RNA Negative Negative    Influenza virus B, PCR Negative Negative    RSV, PCR Negative Negative    SARS-CoV-2 by PCR NotDetected     SARS-CoV-2 Source NP Swab    EKG    Collection Time: 24 10:02 AM   Result Value Ref Range    Report       [facility] Emergency Dept.    Test Date:  2024  Pt Name:    EDMUND OSORIO                 Department: [facility]  MRN:        [id number]                      [facility]  Gender:     Female                       Technician: [id number]  :        [id number]                   Requested By:STAR WHITING  Order #:    [id number]                    Reading MD: Star Whiting MD    Measurements  Intervals                                Axis  Rate:       95                           P:          72  AL:         134                          QRS:        22  QRSD:       86                           T:          77  QT:         349  QTc:        439    Interpretive Statements  Sinus rhythm  Borderline ST depression, anterolateral leads  Compared to ECG 2024 21:10:52  ST (T wave) deviation now present  Sinus tachycardia no longer present  Atrial premature complex(es) no  longer present  Electronically Signed On 12- 10:14:48 PST by Darron Santos MD     CBC WITH DIFFERENTIAL    Collection Time: 12/08/24 10:13 AM   Result Value Ref Range    WBC 6.4 4.8 - 10.8 K/uL    RBC 4.00 (L) 4.20 - 5.40 M/uL    Hemoglobin 13.2 12.0 - 16.0 g/dL    Hematocrit 38.6 37.0 - 47.0 %    MCV 96.5 81.4 - 97.8 fL    MCH 33.0 27.0 - 33.0 pg    MCHC 34.2 32.2 - 35.5 g/dL    RDW 47.1 35.9 - 50.0 fL    Platelet Count 254 164 - 446 K/uL    MPV 8.9 (L) 9.0 - 12.9 fL    Neutrophils-Polys 66.00 44.00 - 72.00 %    Lymphocytes 26.40 22.00 - 41.00 %    Monocytes 6.40 0.00 - 13.40 %    Eosinophils 0.60 0.00 - 6.90 %    Basophils 0.30 0.00 - 1.80 %    Immature Granulocytes 0.30 0.00 - 0.90 %    Nucleated RBC 0.00 0.00 - 0.20 /100 WBC    Neutrophils (Absolute) 4.20 1.82 - 7.42 K/uL    Lymphs (Absolute) 1.68 1.00 - 4.80 K/uL    Monos (Absolute) 0.41 0.00 - 0.85 K/uL    Eos (Absolute) 0.04 0.00 - 0.51 K/uL    Baso (Absolute) 0.02 0.00 - 0.12 K/uL    Immature Granulocytes (abs) 0.02 0.00 - 0.11 K/uL    NRBC (Absolute) 0.00 K/uL   Comp Metabolic Panel    Collection Time: 12/08/24 10:13 AM   Result Value Ref Range    Sodium 136 135 - 145 mmol/L    Potassium 3.8 3.6 - 5.5 mmol/L    Chloride 100 96 - 112 mmol/L    Co2 20 20 - 33 mmol/L    Anion Gap 16.0 7.0 - 16.0    Glucose 91 65 - 99 mg/dL    Bun 10 8 - 22 mg/dL    Creatinine 0.76 0.50 - 1.40 mg/dL    Calcium 9.2 8.4 - 10.2 mg/dL    Correct Calcium 9.1 8.5 - 10.5 mg/dL    AST(SGOT) 20 12 - 45 U/L    ALT(SGPT) 12 2 - 50 U/L    Alkaline Phosphatase 83 30 - 99 U/L    Total Bilirubin 0.4 0.1 - 1.5 mg/dL    Albumin 4.1 3.2 - 4.9 g/dL    Total Protein 6.8 6.0 - 8.2 g/dL    Globulin 2.7 1.9 - 3.5 g/dL    A-G Ratio 1.5 g/dL   TROPONIN    Collection Time: 12/08/24 10:13 AM   Result Value Ref Range    Troponin T <6 6 - 19 ng/L   ESTIMATED GFR    Collection Time: 12/08/24 10:13 AM   Result Value Ref Range    GFR (CKD-EPI) 91 >60 mL/min/1.73 m 2     I have independently interpreted  this EKG    RADIOLOGY/PROCEDURES   I have independently interpreted the diagnostic imaging associated with this visit and am waiting the final reading from the radiologist.   My preliminary interpretation is as follows: No lobar pneumonia    Radiologist interpretation:  DX-CHEST-PORTABLE (1 VIEW)   Final Result         1. No acute cardiopulmonary abnormalities identified.                       COURSE & MEDICAL DECISION MAKING    ASSESSMENT, COURSE AND PLAN  Care Narrative: This is a 59-year-old female with a history of tobacco and alcohol abuse who is here with persistent and intermittent cough that has been ongoing for the past week.  She has no associated shortness of breath or chest pain.  She does admit to feeling as though she is withdrawing from alcohol, she drinks approximately 1 pint of vodka on a daily basis and had a drink this morning when she awoke because she felt shaky.  Here she is tachycardic but afebrile with otherwise normal vital signs, appears well-hydrated and nontoxic.  She is alert, oriented, not tremulous and she has no tongue fasciculations.  She indicates that she is ready to discontinue alcohol use but does not wish to be admitted either here or at a community facility for medical detox.  We discussed Librium and she would like to go that route.  She is not in any respiratory distress.  Her lungs are clear without wheezes or rales.  Chest x-ray is without acute abnormality.  Viral swabs were obtained and negative across-the-board.  Her troponin is negative.  Metabolic panel normal across-the-board.  She continues to be well-appearing.  She was given a dose of oral Ativan for her mild alcohol withdrawal with improvement in her heart rate to 96.  She was provided with outpatient alcohol detox resources.  She was given a prescription for Librium and we went over the risks and benefits.  Her intermittent cough is likely viral in etiology but she can return to work given that she is not  febrile.  Low suspicion for ACS and/or PE.  Chest x-ray without pneumonia.  She was discharged in good and stable condition with strict return precautions.    ADDITIONAL PROBLEMS MANAGED  None    DISPOSITION AND DISCUSSIONS  I have discussed management of the patient with the following physicians and OSCAR's: None    Discussion of management with other QHP or appropriate source(s): None     Escalation of care considered, and ultimately not performed:acute inpatient care management, however at this time, the patient is most appropriate for outpatient management    Barriers to care at this time, including but not limited to:  None .     Decision tools and prescription drugs considered including, but not limited to:  Librium .    FINAL DIAGNOSIS  1. Acute cough    2. Alcohol withdrawal syndrome without complication (HCC)       Electronically signed by: Darron Santos M.D., 12/8/2024 9:48 AM

## 2025-02-18 ENCOUNTER — HOSPITAL ENCOUNTER (OUTPATIENT)
Dept: RADIOLOGY | Facility: MEDICAL CENTER | Age: 60
End: 2025-02-18
Attending: FAMILY MEDICINE
Payer: MEDICAID

## 2025-02-18 DIAGNOSIS — R22.9 SUBCUTANEOUS NODULE: ICD-10-CM

## 2025-07-13 ENCOUNTER — HOSPITAL ENCOUNTER (EMERGENCY)
Facility: MEDICAL CENTER | Age: 60
End: 2025-07-14
Attending: EMERGENCY MEDICINE
Payer: MEDICAID

## 2025-07-13 VITALS
RESPIRATION RATE: 20 BRPM | WEIGHT: 135 LBS | SYSTOLIC BLOOD PRESSURE: 147 MMHG | OXYGEN SATURATION: 97 % | DIASTOLIC BLOOD PRESSURE: 84 MMHG | TEMPERATURE: 97.8 F | HEIGHT: 63 IN | BODY MASS INDEX: 23.92 KG/M2 | HEART RATE: 96 BPM
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
## 2025-07-13 DIAGNOSIS — L03.011 CELLULITIS OF RIGHT RING FINGER: Primary | ICD-10-CM

## 2025-07-13 PROCEDURE — 99282 EMERGENCY DEPT VISIT SF MDM: CPT

## 2025-07-13 ASSESSMENT — PAIN DESCRIPTION - PAIN TYPE: TYPE: ACUTE PAIN

## 2025-07-13 ASSESSMENT — FIBROSIS 4 INDEX: FIB4 SCORE: 1.3

## 2025-07-14 ENCOUNTER — PHARMACY VISIT (OUTPATIENT)
Dept: PHARMACY | Facility: MEDICAL CENTER | Age: 60
End: 2025-07-14
Payer: COMMERCIAL

## 2025-07-14 PROCEDURE — RXMED WILLOW AMBULATORY MEDICATION CHARGE: Performed by: EMERGENCY MEDICINE

## 2025-07-14 RX ORDER — DOXYCYCLINE 100 MG/1
100 CAPSULE ORAL 2 TIMES DAILY
Qty: 10 CAPSULE | Refills: 0 | Status: ACTIVE | OUTPATIENT
Start: 2025-07-14 | End: 2025-07-19

## 2025-07-14 NOTE — ED TRIAGE NOTES
Chief Complaint   Patient presents with    Cat Bite     Cat bite or scratch to the R ring finger. Finger is bruised and swollen, pt states the pain has moved up her arm. The cat was feral and not UTD on its vaccines.      Pt wheeled to triage for above. A+O x 4, GCS 15

## 2025-07-14 NOTE — DISCHARGE INSTRUCTIONS
the prescription for both antibiotics and start taking them tonight.  Finish all 5 days as long as it is getting better.  If you wake up tomorrow and it is significantly worse please come back to the ER.  I would call the [employer] first thing in the morning and see if they still have the cat alive.  If possible, it would be good for them to quarantine the For 10 days so they can look for signs of rabies.  You should see if they can do some rabies testing on it.  If they are unable to do any testing, quarantine it, and you are concerned about possibly getting bitten by the cat come back to the ER and we will start the rabies vaccines

## 2025-07-14 NOTE — ED PROVIDER NOTES
ED Provider Note    CHIEF COMPLAINT  Chief Complaint   Patient presents with    Cat Bite     Cat bite or scratch to the R ring finger. Finger is bruised and swollen, pt states the pain has moved up her arm. The cat was feral and not UTD on its vaccines.        EXTERNAL RECORDS REVIEWED  ED visit December 2024 seen for acute cough.    HPI/ROS  LIMITATION TO HISTORY   Select: : None  OUTSIDE HISTORIAN(S):  None     Tatyana Dallas is a 59 y.o. female who presents to the ER for concern of injury to the right ring finger from a cat.  This happened earlier today about 12 hours ago.  There was a feral cat that her mother takes care of and the cat became angry with her and she thinks scratched her.  She is not 100% sure, it may have bit her but she is pretty sure that it scratched her.  They were taking care of the Because it had crust back legs.  After this they brought it to the [facility] she has not sure if it has been euthanized or not yet.  Over the past 12 hours she has had some progressive redness and swelling over the proximal phalanx extending up the hand.  No fevers or chills.    PAST MEDICAL HISTORY   has a past medical history of ETOH abuse (2/27/2013), HTN (hypertension) (2/27/2013), Hypertension, and Tobacco dependence (2/27/2013).    SURGICAL HISTORY   has a past surgical history that includes other orthopedic surgery (2001).    FAMILY HISTORY  Family History   Problem Relation Age of Onset    Hypertension Mother     Diabetes Mother     Diabetes Father     Hypertension Father     Heart Disease Father     Cancer Maternal Grandmother 50        breast, uterine    Stroke Neg Hx     Hyperlipidemia Neg Hx     Genetic Disorder Neg Hx     Lung Disease Neg Hx        SOCIAL HISTORY  Social History     Tobacco Use    Smoking status: Every Day     Current packs/day: 0.10     Average packs/day: 0.1 packs/day for 30.0 years (3.0 ttl pk-yrs)     Types: Cigarettes    Smokeless tobacco: Never   Vaping Use    Vaping  "status: Never Used   Substance and Sexual Activity    Alcohol use: Yes    Drug use: No    Sexual activity: Yes     Partners: Male       CURRENT MEDICATIONS  Home Medications       Reviewed by Denise Hutchinson R.N. (Registered Nurse) on 07/13/25 at 2312  Med List Status: Partial     Medication Last Dose Status   amLODIPine (NORVASC) 10 MG Tab  Active   aspirin 81 MG EC tablet  Active                    ALLERGIES  Allergies[1]    PHYSICAL EXAM  VITAL SIGNS: BP (!) 147/84   Pulse 96   Temp 36.6 °C (97.8 °F) (Temporal)   Resp 20   Ht 1.6 m (5' 3\")   Wt 61.2 kg (135 lb)   LMP 07/29/2013   SpO2 97%   BMI 23.91 kg/m²    General: Laying calmly in stretcher no distress  CV: Regular rate rhythm no murmurs  Pulmonary: CTAB normal work of breathing  Abdomen: Soft nondistended nontender  MSK: Circumferential edema and erythema of the proximal phalanx of the right ring finger with some slight edema and erythema tracking proximally on the palmar and  dorsal aspect of the hand.  There is a small abrasion/puncture wound over the proximal phalanx.  There is no tenderness distally along the flexor tendon, full range of motion, minimal pain with passive stretch.      COURSE & MEDICAL DECISION MAKING    ASSESSMENT, COURSE AND PLAN  Care Narrative: Differential includes abscess, cellulitis, tenosynovitis, sepsis, Bartonella    On arrival the patient is overall well-appearing.  She is afebrile.  No signs of systemic infection.  Initially she was tachycardic but this was only in triage and that resolved without intervention I suspect it was related to pain/activity.  She does have signs of an infection of the over the proximal phalanx where it looks like she was likely scratched.  I do not feel she needs any imaging or labs at this time but she will be started on oral antibiotics, prescription sent for Augmentin and doxycycline.  I had extensive discussion with the patient regarding rabies risk if this was a bite but she is pretty " sure that it was a scratch.  She is going to get in touch with the [employer] tomorrow to see if the feline is still available or there is any possibility of doing any rabies testing, and if they are unable to quarantine or test she may come back to the ER to obtain rabies vaccines given uncertainty regarding bite/scratch.  I did tell her that rabies vaccine was available today though.  Return precautions were provided and she was discharged home in stable condition.    DISPOSITION AND DISCUSSIONS    Escalation of care considered, and ultimately not performed: Rabies vaccine, patient will follow-up with [facility] first    Barriers to care at this time, including but not limited to: None.     Decision tools and prescription drugs considered including, but not limited to: Doxycycline, Augmentin.    FINAL DIAGNOSIS  1. Cellulitis of right ring finger    2. Cat scratch         Electronically signed by: Christophe Morrison M.D., 7/13/2025 11:59 PM           [1]   Allergies  Allergen Reactions    Oxycodone Nausea

## 2025-07-14 NOTE — ED NOTES
Patient discharged in stable condition per orders. Patient verbalized understanding of all discharge instructions. All belongings accounted for. Patient ambulatory to ER lobby with steady gait.

## 2025-07-15 ENCOUNTER — HOSPITAL ENCOUNTER (EMERGENCY)
Facility: MEDICAL CENTER | Age: 60
End: 2025-07-15
Payer: MEDICAID

## 2025-07-15 VITALS
DIASTOLIC BLOOD PRESSURE: 76 MMHG | HEART RATE: 103 BPM | TEMPERATURE: 98.2 F | BODY MASS INDEX: 24.41 KG/M2 | SYSTOLIC BLOOD PRESSURE: 120 MMHG | RESPIRATION RATE: 14 BRPM | WEIGHT: 137.79 LBS | OXYGEN SATURATION: 93 %

## 2025-07-15 PROCEDURE — 302449 STATCHG TRIAGE ONLY (STATISTIC)

## 2025-07-15 ASSESSMENT — FIBROSIS 4 INDEX: FIB4 SCORE: 1.3

## 2025-07-15 NOTE — ED TRIAGE NOTES
"Chief Complaint   Patient presents with    Digit Pain     Right Fourth Digit  Bit by cat states is not improving w/ prescribed antibx       /76   Pulse (!) 103   Temp 36.8 °C (98.2 °F) (Temporal)   Resp 14   Wt 62.5 kg (137 lb 12.6 oz)   LMP 07/29/2013   SpO2 93%   BMI 24.41 kg/m²     Pt ambulated to ED by self for c/o increasing redness, swelling and pain Right Fourth Digit s/p bit by \"feral\" cat two days ago, states has been taking prescribed antibx w/o relief.  Pt states the cat was tested for Rabies and \"was put down.\"   "

## 2025-07-16 ENCOUNTER — HOSPITAL ENCOUNTER (EMERGENCY)
Facility: MEDICAL CENTER | Age: 60
End: 2025-07-16
Attending: STUDENT IN AN ORGANIZED HEALTH CARE EDUCATION/TRAINING PROGRAM
Payer: MEDICAID

## 2025-07-16 VITALS
RESPIRATION RATE: 15 BRPM | OXYGEN SATURATION: 98 % | WEIGHT: 134.04 LBS | SYSTOLIC BLOOD PRESSURE: 128 MMHG | HEART RATE: 102 BPM | DIASTOLIC BLOOD PRESSURE: 88 MMHG | TEMPERATURE: 97.5 F | BODY MASS INDEX: 23.74 KG/M2

## 2025-07-16 DIAGNOSIS — L03.011 CELLULITIS OF RIGHT RING FINGER: ICD-10-CM
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
## 2025-07-16 DIAGNOSIS — L03.011 CELLULITIS OF FINGER OF RIGHT HAND: ICD-10-CM
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
## 2025-07-16 PROCEDURE — 99282 EMERGENCY DEPT VISIT SF MDM: CPT

## 2025-07-16 ASSESSMENT — FIBROSIS 4 INDEX: FIB4 SCORE: 1.3

## 2025-07-16 ASSESSMENT — PAIN DESCRIPTION - PAIN TYPE: TYPE: ACUTE PAIN

## 2025-07-16 NOTE — ED TRIAGE NOTES
.Tatyana Dallas  .  Chief Complaint   Patient presents with    Cat Bite     Patient with cat scratch to right ring finger.      Patient ambulatory to triage with above complaint. Patient was seen at [facility] 7/13 for same and prescribed antibiotics. Redness and swelling noted to finger. Patient reports no relief from medications.     Patient to lobby and instructed to inform staff of any needs.

## 2025-07-16 NOTE — ED PROVIDER NOTES
ED Provider Note    CHIEF COMPLAINT  Chief Complaint   Patient presents with    Cat Bite     Patient with cat scratch to right ring finger.        EXTERNAL RECORDS REVIEWED  Outpatient Notes office visit on 4/11/2024 for hypertension and tobacco dependence    HPI/ROS  LIMITATION TO HISTORY   Select: : None  OUTSIDE HISTORIAN(S):  Friend reports that the patient has had improved swelling    Tatyana Dallas is a 59 y.o. female who presents with pain and redness to the right ring finger after a cat bite that occurred on Sunday.  Patient was seen on Sunday and prescribed antibiotics, redness is still present but swelling has improved.  Patient denies fevers chills body aches or sweats.  Patient denies nausea vomiting diarrhea.  Patient denies numbness or weakness of the finger.  Patient reports that she has been taking doses since Sunday night.    PAST MEDICAL HISTORY   has a past medical history of ETOH abuse (2/27/2013), HTN (hypertension) (2/27/2013), Hypertension, and Tobacco dependence (2/27/2013).    SURGICAL HISTORY   has a past surgical history that includes other orthopedic surgery (2001).    FAMILY HISTORY  Family History   Problem Relation Age of Onset    Hypertension Mother     Diabetes Mother     Diabetes Father     Hypertension Father     Heart Disease Father     Cancer Maternal Grandmother 50        breast, uterine    Stroke Neg Hx     Hyperlipidemia Neg Hx     Genetic Disorder Neg Hx     Lung Disease Neg Hx        SOCIAL HISTORY  Social History     Tobacco Use    Smoking status: Every Day     Current packs/day: 0.10     Average packs/day: 0.1 packs/day for 30.0 years (3.0 ttl pk-yrs)     Types: Cigarettes    Smokeless tobacco: Never   Vaping Use    Vaping status: Never Used   Substance and Sexual Activity    Alcohol use: Yes    Drug use: No    Sexual activity: Yes     Partners: Male       CURRENT MEDICATIONS  Home Medications       Reviewed by Booker Jorge R.N. (Registered Nurse) on 07/16/25 at  0831  Med List Status: Partial     Medication Last Dose Status   amLODIPine (NORVASC) 10 MG Tab  Active   amoxicillin-clavulanate (AUGMENTIN) 875-125 MG Tab  Active   aspirin 81 MG EC tablet  Active   doxycycline (MONODOX) 100 MG capsule  Active                    ALLERGIES  Allergies[1]    PHYSICAL EXAM  VITAL SIGNS: /88   Pulse (!) 102   Temp 36.4 °C (97.5 °F) (Temporal)   Resp 15   Wt 60.8 kg (134 lb 0.6 oz)   LMP 07/29/2013   SpO2 98%   BMI 23.74 kg/m²    Vitals and nursing note reviewed.   Constitutional:       Comments: Patient is lying in bed supine, pleasant, conversant, speaking in complete sentences   HENT:      Head: Normocephalic and atraumatic.   Eyes:      Extraocular Movements: Extraocular movements intact.      Conjunctiva/sclera: Conjunctivae normal.      Pupils: Pupils are equal, round, and reactive to light.   Cardiovascular:      Pulses: Normal pulses.      Comments:   Pulmonary:      Effort: Pulmonary effort is normal. No respiratory distress.   Musculoskeletal:      Puncture wound which is scabbed with surrounding erythema and mild swelling to the PIP on the right ring finger.  Flexion extension intact at MCP, PIP, DIP but somewhat limited due to mild swelling.  Sensation, cap refill intact distally of the affected digit.  No crepitus.  No tenderness on the flexor surface.  No fusiform swelling.     Cervical back: Normal range of motion. No rigidity.   Skin:     General: Skin is warm and dry.      Capillary Refill: Capillary refill takes less than 2 seconds.   Neurological:      Mental Status: Alert.     COURSE & MEDICAL DECISION MAKING    ASSESSMENT, COURSE AND PLAN  Care Narrative: No evidence of tenosynovitis at this time.  No evidence of sepsis.  No evidence of neurovascular compromise.  No evidence of necrotizing soft tissue infection.  Patient has been taking antibiotics.  Patient reports that swelling has improved but redness still persists, I have encouraged her to  continue taking antibiotics, I have even written an extra 5 days and told the patient to come back if it is not improving over the next 1 to 2 days.  Patient offered rabies Treatment also and has declined, she would like to wait for testing from the [facility] of the cat.    Repeat physical exam benign.  I doubt any serious emergency process at this time.  Patient and/or family, friends given strict return precautions for worsening symptoms and care instructions. They have demonstrated understanding of discharge instructions through teach back mechanism. Advised PCP follow-up in 1-2 days.  Patient/family/friend expresses understanding and agrees to plan.    This dictation has been created using voice recognition software. I am continuously working with the software to minimize the number of voice recognition errors and I have made every attempt to manually correct the errors within my dictation. However errors  related to this voice recognition software may still exist and should be interpreted within the appropriate context.     Electronically signed by: Andrea Swanson M.D., 7/16/2025 9:21 AM      DISPOSITION AND DISCUSSIONS  Escalation of care considered, and ultimately not performed:diagnostic imaging    Decision tools and prescription drugs considered including, but not limited to: Pain Medications  over-the-counter pain medications are appropriate, narcotics not indicated at this time  .    FINAL DIAGNOSIS  1. Cat bite, initial encounter    2. Cellulitis of finger of right hand    3. Cellulitis of right ring finger    4. Cat scratch         Electronically signed by: Andrea Swanson M.D., 7/16/2025 9:18 AM           [1]   Allergies  Allergen Reactions    Oxycodone Nausea